# Patient Record
Sex: FEMALE | Race: BLACK OR AFRICAN AMERICAN | Employment: UNEMPLOYED | URBAN - METROPOLITAN AREA
[De-identification: names, ages, dates, MRNs, and addresses within clinical notes are randomized per-mention and may not be internally consistent; named-entity substitution may affect disease eponyms.]

---

## 2019-09-29 ENCOUNTER — HOSPITAL ENCOUNTER (EMERGENCY)
Age: 29
Discharge: HOME | End: 2019-09-29
Payer: COMMERCIAL

## 2019-09-29 VITALS
SYSTOLIC BLOOD PRESSURE: 134 MMHG | OXYGEN SATURATION: 99 % | WEIGHT: 256 LBS | TEMPERATURE: 98.3 F | DIASTOLIC BLOOD PRESSURE: 68 MMHG | BODY MASS INDEX: 38.8 KG/M2 | RESPIRATION RATE: 14 BRPM | HEIGHT: 68 IN | HEART RATE: 72 BPM

## 2019-09-29 DIAGNOSIS — J02.0 STREP PHARYNGITIS: Primary | ICD-10-CM

## 2019-09-29 PROCEDURE — 99282 EMERGENCY DEPT VISIT SF MDM: CPT

## 2019-09-29 RX ORDER — CEFDINIR 300 MG/1
300 CAPSULE ORAL 2 TIMES DAILY
Qty: 20 CAPSULE | Refills: 0 | Status: SHIPPED | OUTPATIENT
Start: 2019-09-29 | End: 2019-10-09

## 2019-09-29 ASSESSMENT — PAIN DESCRIPTION - LOCATION: LOCATION: THROAT

## 2019-09-29 ASSESSMENT — PAIN SCALES - GENERAL: PAINLEVEL_OUTOF10: 10

## 2019-11-18 ENCOUNTER — HOSPITAL ENCOUNTER (EMERGENCY)
Age: 29
Discharge: HOME | End: 2019-11-18
Attending: EMERGENCY MEDICINE
Payer: COMMERCIAL

## 2019-11-18 ENCOUNTER — APPOINTMENT (OUTPATIENT)
Dept: GENERAL RADIOLOGY | Age: 29
End: 2019-11-18
Payer: COMMERCIAL

## 2019-11-18 VITALS
SYSTOLIC BLOOD PRESSURE: 111 MMHG | WEIGHT: 262 LBS | HEART RATE: 58 BPM | OXYGEN SATURATION: 98 % | RESPIRATION RATE: 26 BRPM | BODY MASS INDEX: 39.71 KG/M2 | HEIGHT: 68 IN | DIASTOLIC BLOOD PRESSURE: 63 MMHG | TEMPERATURE: 97.8 F

## 2019-11-18 DIAGNOSIS — M54.32 SCIATICA OF LEFT SIDE: Primary | ICD-10-CM

## 2019-11-18 PROCEDURE — 72100 X-RAY EXAM L-S SPINE 2/3 VWS: CPT

## 2019-11-18 PROCEDURE — 6360000002 HC RX W HCPCS: Performed by: EMERGENCY MEDICINE

## 2019-11-18 PROCEDURE — 99284 EMERGENCY DEPT VISIT MOD MDM: CPT

## 2019-11-18 PROCEDURE — 96365 THER/PROPH/DIAG IV INF INIT: CPT

## 2019-11-18 PROCEDURE — 73502 X-RAY EXAM HIP UNI 2-3 VIEWS: CPT

## 2019-11-18 PROCEDURE — 2580000003 HC RX 258: Performed by: EMERGENCY MEDICINE

## 2019-11-18 PROCEDURE — 96372 THER/PROPH/DIAG INJ SC/IM: CPT

## 2019-11-18 PROCEDURE — 96375 TX/PRO/DX INJ NEW DRUG ADDON: CPT

## 2019-11-18 RX ORDER — HYDROCODONE BITARTRATE AND ACETAMINOPHEN 5; 325 MG/1; MG/1
1 TABLET ORAL EVERY 4 HOURS PRN
Qty: 18 TABLET | Refills: 0 | Status: SHIPPED | OUTPATIENT
Start: 2019-11-18 | End: 2019-11-21

## 2019-11-18 RX ORDER — METHOCARBAMOL 750 MG/1
1500 TABLET, FILM COATED ORAL 3 TIMES DAILY
Qty: 60 TABLET | Refills: 0 | Status: SHIPPED | OUTPATIENT
Start: 2019-11-18 | End: 2019-11-28

## 2019-11-18 RX ORDER — KETOROLAC TROMETHAMINE 30 MG/ML
30 INJECTION, SOLUTION INTRAMUSCULAR; INTRAVENOUS ONCE
Status: COMPLETED | OUTPATIENT
Start: 2019-11-18 | End: 2019-11-18

## 2019-11-18 RX ORDER — METHYLPREDNISOLONE SODIUM SUCCINATE 125 MG/2ML
125 INJECTION, POWDER, LYOPHILIZED, FOR SOLUTION INTRAMUSCULAR; INTRAVENOUS ONCE
Status: COMPLETED | OUTPATIENT
Start: 2019-11-18 | End: 2019-11-18

## 2019-11-18 RX ADMIN — HYDROMORPHONE HYDROCHLORIDE 1 MG: 1 INJECTION, SOLUTION INTRAMUSCULAR; INTRAVENOUS; SUBCUTANEOUS at 13:19

## 2019-11-18 RX ADMIN — METHOCARBAMOL 1000 MG: 100 INJECTION, SOLUTION INTRAMUSCULAR; INTRAVENOUS at 14:42

## 2019-11-18 RX ADMIN — KETOROLAC TROMETHAMINE 30 MG: 30 INJECTION, SOLUTION INTRAMUSCULAR at 14:41

## 2019-11-18 RX ADMIN — METHYLPREDNISOLONE SODIUM SUCCINATE 125 MG: 125 INJECTION, POWDER, FOR SOLUTION INTRAMUSCULAR; INTRAVENOUS at 14:41

## 2019-11-18 ASSESSMENT — PAIN SCALES - GENERAL
PAINLEVEL_OUTOF10: 10

## 2020-01-27 ENCOUNTER — HOSPITAL ENCOUNTER (EMERGENCY)
Age: 30
Discharge: HOME | End: 2020-01-27
Attending: EMERGENCY MEDICINE
Payer: COMMERCIAL

## 2020-01-27 VITALS
BODY MASS INDEX: 39.71 KG/M2 | HEIGHT: 68 IN | WEIGHT: 262 LBS | DIASTOLIC BLOOD PRESSURE: 76 MMHG | SYSTOLIC BLOOD PRESSURE: 114 MMHG | TEMPERATURE: 99.7 F | RESPIRATION RATE: 16 BRPM | HEART RATE: 87 BPM | OXYGEN SATURATION: 98 %

## 2020-01-27 LAB
HCG, URINE, POC: NEGATIVE
Lab: NORMAL
NEGATIVE QC PASS/FAIL: NORMAL
POSITIVE QC PASS/FAIL: NORMAL
STREP GRP A PCR: POSITIVE

## 2020-01-27 PROCEDURE — 6360000002 HC RX W HCPCS: Performed by: EMERGENCY MEDICINE

## 2020-01-27 PROCEDURE — 96372 THER/PROPH/DIAG INJ SC/IM: CPT

## 2020-01-27 PROCEDURE — 87880 STREP A ASSAY W/OPTIC: CPT

## 2020-01-27 PROCEDURE — 99283 EMERGENCY DEPT VISIT LOW MDM: CPT

## 2020-01-27 RX ORDER — AMOXICILLIN 500 MG/1
500 CAPSULE ORAL 2 TIMES DAILY
Qty: 20 CAPSULE | Refills: 0 | Status: SHIPPED | OUTPATIENT
Start: 2020-01-27 | End: 2020-02-06

## 2020-01-27 RX ORDER — DEXAMETHASONE SODIUM PHOSPHATE 10 MG/ML
10 INJECTION INTRAMUSCULAR; INTRAVENOUS ONCE
Status: COMPLETED | OUTPATIENT
Start: 2020-01-27 | End: 2020-01-27

## 2020-01-27 RX ORDER — KETOROLAC TROMETHAMINE 30 MG/ML
30 INJECTION, SOLUTION INTRAMUSCULAR; INTRAVENOUS ONCE
Status: COMPLETED | OUTPATIENT
Start: 2020-01-27 | End: 2020-01-27

## 2020-01-27 RX ADMIN — KETOROLAC TROMETHAMINE 30 MG: 30 INJECTION, SOLUTION INTRAMUSCULAR at 11:34

## 2020-01-27 RX ADMIN — DEXAMETHASONE SODIUM PHOSPHATE 10 MG: 10 INJECTION INTRAMUSCULAR; INTRAVENOUS at 11:32

## 2020-01-27 ASSESSMENT — ENCOUNTER SYMPTOMS
VOICE CHANGE: 0
NAUSEA: 0
SORE THROAT: 1
SHORTNESS OF BREATH: 0
DIARRHEA: 0
RHINORRHEA: 0
EYE REDNESS: 0
STRIDOR: 0
FACIAL SWELLING: 0
COLOR CHANGE: 0
VOMITING: 0
SINUS CONGESTION: 0
TROUBLE SWALLOWING: 0
COUGH: 0
EYE PAIN: 0
ABDOMINAL PAIN: 0
WHEEZING: 0
SINUS PRESSURE: 0

## 2020-01-27 ASSESSMENT — PAIN DESCRIPTION - LOCATION: LOCATION: THROAT

## 2020-01-27 ASSESSMENT — PAIN DESCRIPTION - PAIN TYPE: TYPE: ACUTE PAIN

## 2020-01-27 ASSESSMENT — PAIN SCALES - GENERAL: PAINLEVEL_OUTOF10: 8

## 2020-01-27 NOTE — ED PROVIDER NOTES
ATTENDING PROVIDER ATTESTATION:     Fernie Woods presented to the emergency department for evaluation of Shortness of Breath (pt states that she woke up this AM with throat swelling/sob, feels like she is having an allergic reaction (allergic to bees and raspberry - no encounter today)) and Oral Swelling   and was initially evaluated by the Medical Resident. See Original ED Note for H&P and ED course above. I have reviewed and discussed the case, including pertinent history (medical, surgical, family and social) and exam findings with the Medical Resident assigned to Jesusjude Peggy. I have personally performed and/or participated in the history, exam, medical decision making, and procedures and agree with all pertinent clinical information. I have reviewed my findings and recommendations with the assigned Medical Resident, Fernie Woods and members of family present at the time of disposition. My findings/plan: The encounter diagnosis was Pharyngitis due to Streptococcus species. Discharge Medication List as of 1/27/2020 12:04 PM      START taking these medications    Details   amoxicillin (AMOXIL) 500 MG capsule Take 1 capsule by mouth 2 times daily for 10 days, Disp-20 capsule, R-0Print           Luke Maldonado MD    Patient is a 25-year-old female, past medical history of diabetes, tobacco use, who presents via PV for SOB and throat pain. Patient reports she awakened this morning and noted a severe pain in her throat. It is worse with swallowing and eating and drinking. Nothing seems to make it better or worse. She is taken nothing for alleviation. She reports it feels like \"I am swallowing razors\" she reports a sensation that her throat is swelling as well. She denies any difficulty breathing shortness of breath or wheezing or stridor. Denies fevers or chills, coughing, recent ENT or throat procedures or surgeries. She denies known sick contacts.   Denies General: She is not in acute distress. Appearance: She is well-developed. She is obese. She is not ill-appearing, toxic-appearing or diaphoretic. HENT:      Head: Normocephalic and atraumatic. Right Ear: Tympanic membrane, ear canal and external ear normal. There is no impacted cerumen. Left Ear: Tympanic membrane, ear canal and external ear normal. There is no impacted cerumen. Nose: Nose normal. No congestion or rhinorrhea. Mouth/Throat:      Mouth: Mucous membranes are moist. Oral lesions present. No angioedema. Tongue: No lesions. Pharynx: Uvula midline. Posterior oropharyngeal erythema present. No pharyngeal swelling, oropharyngeal exudate or uvula swelling. Tonsils: No tonsillar exudate. Comments: Erythema the posterior oropharynx with vesicular lesions in the soft palate, no tongue swelling, no peritonsillar swelling or asymmetry uvula is midline  Eyes:      General: No scleral icterus. Right eye: No discharge. Left eye: No discharge. Conjunctiva/sclera: Conjunctivae normal.      Pupils: Pupils are equal, round, and reactive to light. Neck:      Musculoskeletal: Normal range of motion and neck supple. Thyroid: No thyromegaly. Vascular: No JVD. Trachea: No tracheal deviation. Cardiovascular:      Rate and Rhythm: Normal rate and regular rhythm. Heart sounds: Normal heart sounds. No murmur. No friction rub. No gallop. Pulmonary:      Effort: Pulmonary effort is normal. No accessory muscle usage or respiratory distress. Breath sounds: Normal breath sounds. No wheezing, rhonchi or rales. Chest:      Chest wall: No tenderness or crepitus. Abdominal:      General: Bowel sounds are normal. There is no distension. Palpations: Abdomen is soft. Tenderness: There is no abdominal tenderness. There is no guarding or rebound. Musculoskeletal: Normal range of motion. General: No tenderness or deformity. Skin:     General: Skin is warm and dry. Coloration: Skin is not pale. Findings: No erythema or rash. Neurological:      Mental Status: She is alert and oriented to person, place, and time. Cranial Nerves: No cranial nerve deficit. Motor: No abnormal muscle tone. Coordination: Coordination normal.          Procedures     MDM  Number of Diagnoses or Management Options  Pharyngitis due to Streptococcus species:   Diagnosis management comments: Patient presents with sore throat. She was found to have strep pharyngitis. No evidence of airway compromise or peritonsillar abscess. Plan to treat with antibiotics. Strict ED return precautions discussed. ED Course as of Jan 28 1253   Mon Jan 27, 2020   1213 Patient very well-appearing on exam.  States that she has \"shortness of breath\" in her throat, but no trouble breathing in her chest or chest pain. She has bilateral tonsillar enlargement and erythema with exudate. No evidence of peritonsillar abscess. Uvula midline. No tongue swelling or lip swelling. No soft palate elevation or evidence of ivan's. No stridor or trismus. No nuchal rigidity. [JA]      ED Course User Index  [JA] Veda Gregory MD        --------------------------------------------- PAST HISTORY ---------------------------------------------  Past Medical History:  has a past medical history of Bipolar 1 disorder ([facility] [address] [facility] Depression, Diabetes mellitus ([facility] FH: multiple miscarriages or stillbirths, Septal defect, atrial, Shingles, and Thyroid disease. Past Surgical History:  has a past surgical history that includes Dilation and curettage of uterus. Social History:  reports that she has been smoking cigarettes. She has a 6.00 pack-year smoking history. She has never used smokeless tobacco. She reports current drug use. Frequency: 1.00 time per week. Drug: Marijuana. She reports that she does not drink alcohol.     Family History: family history is not follow-up. The plan has been discussed in detail and they are aware of the specific conditions for emergent return, as well as the importance of follow-up. Discharge Medication List as of 1/27/2020 12:04 PM      START taking these medications    Details   amoxicillin (AMOXIL) 500 MG capsule Take 1 capsule by mouth 2 times daily for 10 days, Disp-20 capsule, R-0Print             Diagnosis:  1. Pharyngitis due to Streptococcus species        Disposition:  Patient's disposition: Discharge to home  Patient's condition is stable.           Good Goldsmith DO  Resident  01/28/20 [id number]       Heaven Maldonado MD  01/28/20 6153

## 2020-01-27 NOTE — ED NOTES
Bed: 10  Expected date:   Expected time:   Means of arrival:   Comments:  ems     Tory Ann RN  01/27/20 1111

## 2020-02-21 ENCOUNTER — HOSPITAL ENCOUNTER (EMERGENCY)
Age: 30
Discharge: HOME | End: 2020-02-21
Attending: EMERGENCY MEDICINE
Payer: COMMERCIAL

## 2020-02-21 ENCOUNTER — APPOINTMENT (OUTPATIENT)
Dept: GENERAL RADIOLOGY | Age: 30
End: 2020-02-21
Payer: COMMERCIAL

## 2020-02-21 VITALS
TEMPERATURE: 98.1 F | RESPIRATION RATE: 20 BRPM | DIASTOLIC BLOOD PRESSURE: 62 MMHG | HEIGHT: 68 IN | HEART RATE: 88 BPM | BODY MASS INDEX: 34.01 KG/M2 | WEIGHT: 224.4 LBS | SYSTOLIC BLOOD PRESSURE: 99 MMHG | OXYGEN SATURATION: 97 %

## 2020-02-21 LAB
ALBUMIN SERPL-MCNC: 4.1 G/DL (ref 3.5–5.2)
ALP BLD-CCNC: 61 U/L (ref 35–104)
ALT SERPL-CCNC: 7 U/L (ref 0–32)
ANION GAP SERPL CALCULATED.3IONS-SCNC: 13 MMOL/L (ref 7–16)
AST SERPL-CCNC: 8 U/L (ref 0–31)
BASOPHILS ABSOLUTE: 0.02 E9/L (ref 0–0.2)
BASOPHILS RELATIVE PERCENT: 0.2 % (ref 0–2)
BILIRUB SERPL-MCNC: <0.2 MG/DL (ref 0–1.2)
BUN BLDV-MCNC: 9 MG/DL (ref 6–20)
CALCIUM SERPL-MCNC: 9.5 MG/DL (ref 8.6–10.2)
CHLORIDE BLD-SCNC: 100 MMOL/L (ref 98–107)
CO2: 24 MMOL/L (ref 22–29)
CREAT SERPL-MCNC: 0.6 MG/DL (ref 0.5–1)
EOSINOPHILS ABSOLUTE: 0.16 E9/L (ref 0.05–0.5)
EOSINOPHILS RELATIVE PERCENT: 1.6 % (ref 0–6)
GFR AFRICAN AMERICAN: >60
GFR NON-AFRICAN AMERICAN: >60 ML/MIN/1.73
GLUCOSE BLD-MCNC: 220 MG/DL (ref 74–99)
HCT VFR BLD CALC: 42.1 % (ref 34–48)
HEMOGLOBIN: 13.7 G/DL (ref 11.5–15.5)
IMMATURE GRANULOCYTES #: 0.02 E9/L
IMMATURE GRANULOCYTES %: 0.2 % (ref 0–5)
LYMPHOCYTES ABSOLUTE: 4.13 E9/L (ref 1.5–4)
LYMPHOCYTES RELATIVE PERCENT: 41.7 % (ref 20–42)
MCH RBC QN AUTO: 31.4 PG (ref 26–35)
MCHC RBC AUTO-ENTMCNC: 32.5 % (ref 32–34.5)
MCV RBC AUTO: 96.6 FL (ref 80–99.9)
MONOCYTES ABSOLUTE: 0.7 E9/L (ref 0.1–0.95)
MONOCYTES RELATIVE PERCENT: 7.1 % (ref 2–12)
NEUTROPHILS ABSOLUTE: 4.88 E9/L (ref 1.8–7.3)
NEUTROPHILS RELATIVE PERCENT: 49.2 % (ref 43–80)
PDW BLD-RTO: 13.4 FL (ref 11.5–15)
PLATELET # BLD: 387 E9/L (ref 130–450)
PMV BLD AUTO: 10 FL (ref 7–12)
POTASSIUM REFLEX MAGNESIUM: 4.5 MMOL/L (ref 3.5–5)
RBC # BLD: 4.36 E12/L (ref 3.5–5.5)
SODIUM BLD-SCNC: 137 MMOL/L (ref 132–146)
TOTAL PROTEIN: 7.5 G/DL (ref 6.4–8.3)
WBC # BLD: 9.9 E9/L (ref 4.5–11.5)

## 2020-02-21 PROCEDURE — 6360000002 HC RX W HCPCS: Performed by: STUDENT IN AN ORGANIZED HEALTH CARE EDUCATION/TRAINING PROGRAM

## 2020-02-21 PROCEDURE — 85025 COMPLETE CBC W/AUTO DIFF WBC: CPT

## 2020-02-21 PROCEDURE — 80053 COMPREHEN METABOLIC PANEL: CPT

## 2020-02-21 PROCEDURE — 96375 TX/PRO/DX INJ NEW DRUG ADDON: CPT

## 2020-02-21 PROCEDURE — 72100 X-RAY EXAM L-S SPINE 2/3 VWS: CPT

## 2020-02-21 PROCEDURE — 99284 EMERGENCY DEPT VISIT MOD MDM: CPT

## 2020-02-21 PROCEDURE — 96365 THER/PROPH/DIAG IV INF INIT: CPT

## 2020-02-21 PROCEDURE — 2580000003 HC RX 258: Performed by: STUDENT IN AN ORGANIZED HEALTH CARE EDUCATION/TRAINING PROGRAM

## 2020-02-21 RX ORDER — KETOROLAC TROMETHAMINE 30 MG/ML
30 INJECTION, SOLUTION INTRAMUSCULAR; INTRAVENOUS ONCE
Status: COMPLETED | OUTPATIENT
Start: 2020-02-21 | End: 2020-02-21

## 2020-02-21 RX ORDER — AMOXICILLIN AND CLAVULANATE POTASSIUM 875; 125 MG/1; MG/1
1 TABLET, FILM COATED ORAL 2 TIMES DAILY WITH MEALS
Qty: 20 TABLET | Refills: 0 | Status: SHIPPED | OUTPATIENT
Start: 2020-02-21 | End: 2020-03-02

## 2020-02-21 RX ORDER — IBUPROFEN 600 MG/1
600 TABLET ORAL 4 TIMES DAILY PRN
Qty: 60 TABLET | Refills: 0 | Status: SHIPPED | OUTPATIENT
Start: 2020-02-21 | End: 2021-04-12

## 2020-02-21 RX ADMIN — AMPICILLIN SODIUM AND SULBACTAM SODIUM 3 G: 2; 1 INJECTION, POWDER, FOR SOLUTION INTRAMUSCULAR; INTRAVENOUS at 07:50

## 2020-02-21 RX ADMIN — KETOROLAC TROMETHAMINE 30 MG: 30 INJECTION, SOLUTION INTRAMUSCULAR; INTRAVENOUS at 07:28

## 2020-02-21 ASSESSMENT — ENCOUNTER SYMPTOMS
SINUS PAIN: 0
ABDOMINAL PAIN: 1
SHORTNESS OF BREATH: 0
WHEEZING: 0
DIARRHEA: 0
SORE THROAT: 0
NAUSEA: 1
CONSTIPATION: 0
VOMITING: 0
COUGH: 0
BLOOD IN STOOL: 0
BACK PAIN: 1

## 2020-02-21 ASSESSMENT — PAIN DESCRIPTION - DESCRIPTORS
DESCRIPTORS_2: OTHER (COMMENT)
DESCRIPTORS: CONSTANT;OTHER (COMMENT)

## 2020-02-21 ASSESSMENT — PAIN DESCRIPTION - LOCATION
LOCATION_2: TEETH
LOCATION: SACRUM

## 2020-02-21 ASSESSMENT — PAIN SCALES - GENERAL
PAINLEVEL_OUTOF10: 10
PAINLEVEL_OUTOF10: 10

## 2020-02-21 ASSESSMENT — PAIN DESCRIPTION - ONSET
ONSET_2: ON-GOING
ONSET: ON-GOING

## 2020-02-21 ASSESSMENT — PAIN DESCRIPTION - PAIN TYPE
TYPE_2: ACUTE PAIN
TYPE: ACUTE PAIN

## 2020-02-21 ASSESSMENT — PAIN DESCRIPTION - DIRECTION: RADIATING_TOWARDS: LEFT LEG

## 2020-02-21 ASSESSMENT — PAIN DESCRIPTION - ORIENTATION
ORIENTATION_2: RIGHT;UPPER;LOWER
ORIENTATION: LEFT

## 2020-02-21 ASSESSMENT — PAIN DESCRIPTION - INTENSITY: RATING_2: 10

## 2020-02-21 ASSESSMENT — PAIN DESCRIPTION - FREQUENCY: FREQUENCY: CONTINUOUS

## 2020-02-21 ASSESSMENT — PAIN DESCRIPTION - DURATION: DURATION_2: INTERMITTENT

## 2020-02-21 NOTE — ED TRIAGE NOTES
Pt arrived in ED with c/o pain originating in her sacrum/coccyx that radiates down her left leg onset a few days ago, worsening. Hx sciatica. C/o upper and lower right sided dental pain where pt states she has a \"hole\" in her right upper gum and she feels she has an abscess. She states she called the dental clinic and it would be a month before she could be seen. Denies taking any pain medications PTA. No visitors at bedside.
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
## 2021-04-12 ENCOUNTER — ANCILLARY PROCEDURE (OUTPATIENT)
Dept: OBGYN CLINIC | Age: 31
End: 2021-04-12
Payer: COMMERCIAL

## 2021-04-12 ENCOUNTER — INITIAL PRENATAL (OUTPATIENT)
Dept: OBGYN CLINIC | Age: 31
End: 2021-04-12
Payer: COMMERCIAL

## 2021-04-12 VITALS
SYSTOLIC BLOOD PRESSURE: 98 MMHG | WEIGHT: 238.13 LBS | DIASTOLIC BLOOD PRESSURE: 61 MMHG | BODY MASS INDEX: 36.21 KG/M2 | HEART RATE: 83 BPM

## 2021-04-12 DIAGNOSIS — Q76.49 CAUDAL REGRESSION SYNDROME: ICD-10-CM

## 2021-04-12 DIAGNOSIS — Z3A.22 22 WEEKS GESTATION OF PREGNANCY: ICD-10-CM

## 2021-04-12 DIAGNOSIS — Q21.10 ASD (ATRIAL SEPTAL DEFECT): ICD-10-CM

## 2021-04-12 DIAGNOSIS — O35.8XX9: ICD-10-CM

## 2021-04-12 DIAGNOSIS — O99.332 HIGH RISK PREGNANCY DUE TO SMOKING IN SECOND TRIMESTER: ICD-10-CM

## 2021-04-12 DIAGNOSIS — O26.20 HABITUAL ABORTER, ANTEPARTUM: ICD-10-CM

## 2021-04-12 DIAGNOSIS — O99.342 BIPOLAR DISEASE DURING PREGNANCY IN SECOND TRIMESTER (HCC): ICD-10-CM

## 2021-04-12 DIAGNOSIS — Z36.3 ANTENATAL SCREENING FOR MALFORMATION USING ULTRASONICS: Primary | ICD-10-CM

## 2021-04-12 DIAGNOSIS — F31.9 BIPOLAR DISEASE DURING PREGNANCY IN SECOND TRIMESTER (HCC): ICD-10-CM

## 2021-04-12 DIAGNOSIS — I10 ESSENTIAL HYPERTENSION: ICD-10-CM

## 2021-04-12 DIAGNOSIS — O35.2XX0: ICD-10-CM

## 2021-04-12 DIAGNOSIS — O99.280 THYROID DYSFUNCTION, ANTEPARTUM: ICD-10-CM

## 2021-04-12 DIAGNOSIS — O24.112 TYPE 2 DIABETES MELLITUS COMPLICATING PREGNANCY, ANTEPARTUM, SECOND TRIMESTER: ICD-10-CM

## 2021-04-12 DIAGNOSIS — O99.212 OBESITY COMPLICATING PERIPREGNANCY, ANTEPARTUM, SECOND TRIMESTER: ICD-10-CM

## 2021-04-12 DIAGNOSIS — E07.9 THYROID DYSFUNCTION, ANTEPARTUM: ICD-10-CM

## 2021-04-12 LAB
GLUCOSE URINE, POC: NORMAL
PROTEIN UA: NEGATIVE

## 2021-04-12 PROCEDURE — 76811 OB US DETAILED SNGL FETUS: CPT | Performed by: OBSTETRICS & GYNECOLOGY

## 2021-04-12 PROCEDURE — 81002 URINALYSIS NONAUTO W/O SCOPE: CPT | Performed by: OBSTETRICS & GYNECOLOGY

## 2021-04-12 PROCEDURE — 99203 OFFICE O/P NEW LOW 30 MIN: CPT | Performed by: OBSTETRICS & GYNECOLOGY

## 2021-04-12 PROCEDURE — 4004F PT TOBACCO SCREEN RCVD TLK: CPT | Performed by: OBSTETRICS & GYNECOLOGY

## 2021-04-12 PROCEDURE — 3046F HEMOGLOBIN A1C LEVEL >9.0%: CPT | Performed by: OBSTETRICS & GYNECOLOGY

## 2021-04-12 PROCEDURE — G8427 DOCREV CUR MEDS BY ELIG CLIN: HCPCS | Performed by: OBSTETRICS & GYNECOLOGY

## 2021-04-12 PROCEDURE — 2022F DILAT RTA XM EVC RTNOPTHY: CPT | Performed by: OBSTETRICS & GYNECOLOGY

## 2021-04-12 PROCEDURE — G8419 CALC BMI OUT NRM PARAM NOF/U: HCPCS | Performed by: OBSTETRICS & GYNECOLOGY

## 2021-04-12 RX ORDER — METOPROLOL SUCCINATE 25 MG/1
25 TABLET, EXTENDED RELEASE ORAL DAILY
COMMUNITY

## 2021-04-12 NOTE — PATIENT INSTRUCTIONS
Patient Education        Learning About When to Call Your Doctor During Pregnancy (After 20 Weeks)  Your Care Instructions  It's common to have concerns about what might be a problem during pregnancy. Although most pregnant women don't have any serious problems, it's important to know when to call your doctor if you have certain symptoms or signs of labor. These are general suggestions. Your doctor may give you some more information about when to call. When to call your doctor (after 20 weeks)  Call 911 anytime you think you may need emergency care. For example, call if:  · You have severe vaginal bleeding. · You have sudden, severe pain in your belly. · You passed out (lost consciousness). · You have a seizure. · You see or feel the umbilical cord. · You think you are about to deliver your baby and can't make it safely to the hospital.  Call your doctor now or seek immediate medical care if:  · You have vaginal bleeding. · You have belly pain. · You have a fever. · You have symptoms of preeclampsia, such as:  ? Sudden swelling of your face, hands, or feet. ? New vision problems (such as dimness, blurring, or seeing spots). ? A severe headache. · You have a sudden release of fluid from your vagina. (You think your water broke.)  · You think that you may be in labor. This means that you've had at least 6 contractions in an hour. · You notice that your baby has stopped moving or is moving much less than normal.  · You have symptoms of a urinary tract infection. These may include:  ? Pain or burning when you urinate. ? A frequent need to urinate without being able to pass much urine. ? Pain in the flank, which is just below the rib cage and above the waist on either side of the back. ? Blood in your urine. Watch closely for changes in your health, and be sure to contact your doctor if:  · You have vaginal discharge that smells bad. · You have skin changes, such as:  ? A rash. ? Itching.   ? Yellow results and keep a list of the medicines you take. Where can you learn more? Go to https://chpepiceweb.healthapta.[email or web address] org and sign in to your [employer] account. Enter  in the [facility] box to learn more about \"Learning About When to Call Your Doctor During Pregnancy (After 20 Weeks). \"     If you do not have an account, please click on the \"Sign Up Now\" link. Current as of: October 8, 2020               Content Version: 12.8  © 1254-6803 [employer]. Care instructions adapted under license by [facility] ([facility]). If you have questions about a medical condition or this instruction, always ask your healthcare professional. [place] [address] any warranty or liability for your use of this information. Patient Education        Weeks 22 to 26 of Your Pregnancy: Care Instructions  Overview     As you enter your 7th month of pregnancy at week 26, your baby's lungs are growing stronger and getting ready to breathe. You may notice that your baby responds to the sound of your or your partner's voice. You may also notice that your baby does less turning and twisting and more squirming, kicking, or jerking. Jerking often means that your baby has hiccups. Hiccups are normal and are only temporary. You may want to think about attending a childbirth preparation class. This is also a good time to start thinking about whether you want to have pain medicine during labor. Most pregnant women are tested for gestational diabetes between weeks 25 and 28. Gestational diabetes occurs when your blood sugar level gets too high when you're pregnant. The test is important, because you can have gestational diabetes and not know it. But the condition can cause problems for your baby. Follow-up care is a key part of your treatment and safety. Be sure to make and go to all appointments, and call your doctor if you are having problems.  It's also a good idea to know your test results and keep a list of the medicines you take. How can you care for yourself at home? Ease discomfort from your baby's kicking  · Change your position. Sometimes this will cause your baby to change position too. · Take a deep breath while you raise your arm over your head. Then breathe out while you drop your arm. Do Kegel exercises to prevent urine from leaking  · You can do Kegel exercises while you stand or sit. ? Squeeze the same muscles you would use to stop your urine. Your belly and thighs should not move. ? Hold the squeeze for 3 seconds, and then relax for 3 seconds. ? Start with 3 seconds. Then add 1 second each week until you are able to squeeze for 10 seconds. ? Repeat the exercise 10 to 15 times for each session. Do three or more sessions each day. Ease or reduce swelling in your feet, ankles, hands, and fingers  · If your fingers are puffy, take off your rings. · Do not eat high-salt foods, such as potato chips. · Prop up your feet on a stool or couch as much as possible. Sleep with pillows under your feet. · Do not stand for long periods of time or wear tight shoes. · Wear support stockings. Where can you learn more? Go to https://Constant Therapy.[employer]. org and sign in to your [employer] account. Enter G264 in the [facility] box to learn more about \"Weeks 22 to 26 of Your Pregnancy: Care Instructions. \"     If you do not have an account, please click on the \"Sign Up Now\" link. Current as of: October 8, 2020               Content Version: 12.8  © 4922-0710 [employer]. Care instructions adapted under license by [facility] ([facility]). If you have questions about a medical condition or this instruction, always ask your healthcare professional. [place] [address] any warranty or liability for your use of this information.

## 2021-04-12 NOTE — LETTER
[facility] MATERNAL FETAL MEDICINE  [address] [address] [address]   Dept: [phone]  Loc: [phone]  Yahaira Tinajero MD                                           [facility] Consultation Letter     21     Chris Alfonso MD     Re: Patient: Radha Angulo  YOB: 1990    MR Number: [id number] Date of Visit: 2021     Dear Dr. Enma Mcdonald  I had the pleasure of seeing your patient, Radha Angulo, in consultation in the [facility] Fetal Medicine Department of [facility]). DOS: 21     [place] MATERNAL FETAL MEDICINE      MATERNAL-FETAL MEDICINE CONSULT    Referring/Requesting Provider: Abigail Andino MD  [address]  [address] [place] [address]       CHIEF COMPLAINT: Type 2 DM, chronic hypertension, obesity, bipolar, smoker, ASD, and her sister has T21. HISTORY OF PRESENT ILLNESS:   Bob Merino is a 26 y.o. female [id number] at 22w6d who presents for ultrasound and consultation regarding Type 2 DM, chronic hypertension, obesity, bipolar, smoker, ASD, and her sister has T21. The patient denies nausea, vomiting, vaginal bleeding, leakage of fluid, contractions, and/or abdominal pain. She also denies blurring of vision, visual disturbances, headaches, and /or epigastric pain.      OB HISTORY:  OB History    Para Term  AB Living   11 2 2 0 8 0   SAB TAB Ectopic Molar Multiple Live Births   8 0 0 0 0 0      # Outcome Date GA Lbr Jas/2nd Weight Sex Delivery Anes PTL Lv   11 Current            10 Term 11/15/17    F CS-LTranv      9 Term 07    F Vag-Spont      8 SAB            7 SAB            6 SAB            5 SAB            4 SAB            3 SAB            2 SAB            1 SAB               Obstetric Comments   8 SAB       PAST MEDICAL HISTORY:  Past Medical History:   Diagnosis Date    Anxiety     Bipolar 1 disorder ([facility]     Depression     Diabetes mellitus ([facility]     FH: multiple miscarriages or stillbirths     Hypertension     Septal defect, atrial     Shingles     Thyroid disease        PAST SURGICAL HISTORY:  Past Surgical History:   Procedure Laterality Date     SECTION  2017    DILATION AND CURETTAGE OF UTERUS         PERTINENT FAMILY HISTORY:  History reviewed. No pertinent family history. MEDS:   Current Outpatient Rx   Medication Sig Dispense Refill    Prenatal Vit-Fe Fumarate-FA (PRENATAL VITAMINS PO) Take 1 tablet by mouth daily      metoprolol succinate (TOPROL XL) 25 MG extended release tablet Take 25 mg by mouth daily      metFORMIN (GLUCOPHAGE) 500 MG tablet Take 500 mg by mouth 2 times daily (with meals) Taking 500 mg lunch and dinner      ACYCLOVIR PO Take 500 mg by mouth 2 times daily as needed          ALLERGY:   Allergies   Allergen Reactions    Bee Venom     Raspberry      Review of the systems:  Galina denies fever, chills, headaches, visual changes, stuffy/running nose, sore throat, chest pain, heart palpitations, edema, pain with breathing, shortness of breath, nausea or vomiting, difficult or painful urination, joint or muscle pain, numbness, tingling, tremors, and/or seizures. Physical Exam  Vitals signs and nursing note reviewed. BP 98/61   Pulse 83   Wt 238 lb 2 oz (108 kg)   LMP 2020   BMI 36.21 kg/m²   Constitutional: Appearance: She is not in distress  Heart rate is regular  Neck is supple with normal range of motion  No respiratory distress  Abdomen is soft  Neurological:      General: No focal deficit present. Mental Status: She is alert and oriented to person, place, and time. Psychiatric:         Mood and Affect: Mood normal.         Behavior: Behavior normal.    Fetal heart tones: Positive      IMAGING:  Please see ultrasound report for full details.      LABS:    Initial Prenatal on 2021   Component Date Value Ref Range Status    Protein, UA 2021 Negative  Negative Final    Glucose, UA POC 2021 neg   Final         IMPRESSION:   Kaden Robins is specialist.  8. Recommend baby aspirin        Essential hypertension 2021     Overview Note:     - She has essential hypertension on Metoprolol. Recommend:  - Baseline preeclampsia lab work is indicated (CMP, P/C ratio)  - Ophthalmological evaluation.  - EKG and echocardiogram.  - Baby aspirin a day.  Habitual aborter, antepartum 2021     Overview Note:     - She has multiple first trimester losses in the ifrst trimester    Recommend:  1. Lupus anticoagulant  2. Anticardiolipin antibodies  3. B2 glycoprotein antibodies      Caudal regression syndrome 2021     Overview Note:     The patient has a daughter with caudal regression most likely secondary to uncontrolled diabetes. Recommendations:  1. Detailed fetal anatomy scan  2. Genetic counseling      Obesity complicating peripregnancy, antepartum, second trimester 2021     Overview Note:     We reviewed that obesity complicating pregnancy is associated with increased maternal and fetal risks including: gestational diabetes, hypertensive disorders, iatrogenic  delivery, dysfunctional labor, postterm pregnancy, large for gestational age infant, shoulder dystocia, obstructive sleep apnea, postpartum hemorrhage, stillbirth, fetal anomalies,  delivery and postcesarean complications. RECOMMENDATIONS:   Nutrition consultation is recommended.  Reviewed diet, exercise, and weight gain. [place] [facility] recommend weight gain in pregnancy: Obese (all classes) > 30:  total weight gain 11-20 lbs, 0.5 lb per week in the second and third trimester.    Low dose aspirin is recommended   After  delivery, provide adequate thromboprophylaxis    If  delivery, surgical technique needs to be modified for adequate exposure and postpartum care should be modified to reduce the risk of obesity-associated complications   Lactation consultation and encourage breastfeeding   I recommended an intrauterine contraception (Mirena) due to efficacy and protective affect against uterine cancer              Thyroid dysfunction, antepartum 04/12/2021     Overview Note:     - She has hypothyroidism on no medications. I ordered TSH and Free T4 for evaluation of her thyroid function.  Hereditary disease in family possibly affecting fetus, affecting management of mother, antepartum condition or complication, single gestation 04/12/2021     Overview Note:     The patient has a sister with Down syndrome. She has a low risk cell free test in this pregnancy.  High risk pregnancy due to smoking in second trimester 04/12/2021     Overview Note:     The risks of smoking have been discussed:   PTB   PPROM   Abruption   FGR   SIDS  I urged her to quit smoking.  ASD (atrial septal defect) 04/12/2021     Overview Note:     - She has an ASD  - No surgical repair     Recommend;  Cardiology consultation  echocardiogram      Bipolar disease during pregnancy in second trimester ([facility] [address] [facility] 04/12/2021     Overview Note:     - On no medications. Recommend   Psychiatric consultation        She will be admitted to [facility] in 2 or 3 days. I ordered:  Hemoglobin A1c,TSH, T4, LAC, KHURRAM, B2 glycoprotein. The rest will be done at [facility]. The total patient time of the visit was 30 minutes, of which was greater than 50% of the time was spent counseling and coordinating care. Dorothy Woody MD            Thank you for allowing us to participate in the care of your patient. Should you or the family have any questions or concerns, please do not hesitate to contact us.     Sincerely,    Dorothy Woody MD

## 2021-04-12 NOTE — PROGRESS NOTES
DOS: 21     [place] MATERNAL FETAL MEDICINE      MATERNAL-FETAL MEDICINE CONSULT    Referring/Requesting Provider: Justin Lim MD  [address]  [address] [place] [address],  [address] [place] [address]       CHIEF COMPLAINT: Type 2 DM, chronic hypertension, obesity, bipolar, smoker, ASD, and her sister has T21. HISTORY OF PRESENT ILLNESS:   Woody Browne is a 26 y.o. female [id number] at 22w6d who presents for ultrasound and consultation regarding Type 2 DM, chronic hypertension, obesity, bipolar, smoker, ASD, and her sister has T21. The patient denies nausea, vomiting, vaginal bleeding, leakage of fluid, contractions, and/or abdominal pain. She also denies blurring of vision, visual disturbances, headaches, and /or epigastric pain. OB HISTORY:  OB History    Para Term  AB Living   11 2 2 0 8 0   SAB TAB Ectopic Molar Multiple Live Births   8 0 0 0 0 0      # Outcome Date GA Lbr Jas/2nd Weight Sex Delivery Anes PTL Lv   11 Current            10 Term 11/15/17    F CS-LTranv      9 Term 07    F Vag-Spont      8 SAB            7 SAB            6 SAB            5 SAB            4 SAB            3 SAB            2 SAB            1 SAB               Obstetric Comments   8 SAB       PAST MEDICAL HISTORY:  Past Medical History:   Diagnosis Date    Anxiety     Bipolar 1 disorder ([facility] [address]     Depression     Diabetes mellitus ([facility] [facility]     FH: multiple miscarriages or stillbirths     Hypertension     Septal defect, atrial     Shingles     Thyroid disease        PAST SURGICAL HISTORY:  Past Surgical History:   Procedure Laterality Date     SECTION      DILATION AND CURETTAGE OF UTERUS         PERTINENT FAMILY HISTORY:  History reviewed. No pertinent family history.     MEDS:   Current Outpatient Rx   Medication Sig Dispense Refill    Prenatal Vit-Fe Fumarate-FA (PRENATAL VITAMINS PO) Take 1 tablet by mouth daily      metoprolol succinate (TOPROL XL) 25 MG extended release tablet Take 25 mg by mouth daily  metFORMIN (GLUCOPHAGE) 500 MG tablet Take 500 mg by mouth 2 times daily (with meals) Taking 500 mg lunch and dinner      ACYCLOVIR PO Take 500 mg by mouth 2 times daily as needed          ALLERGY:   Allergies   Allergen Reactions    Bee Venom     Raspberry      Review of the systems:  Galina denies fever, chills, headaches, visual changes, stuffy/running nose, sore throat, chest pain, heart palpitations, edema, pain with breathing, shortness of breath, nausea or vomiting, difficult or painful urination, joint or muscle pain, numbness, tingling, tremors, and/or seizures. Physical Exam  Vitals signs and nursing note reviewed. BP 98/61   Pulse 83   Wt 238 lb 2 oz (108 kg)   LMP 11/03/2020   BMI 36.21 kg/m²   Constitutional: Appearance: She is not in distress  Heart rate is regular  Neck is supple with normal range of motion  No respiratory distress  Abdomen is soft  Neurological:      General: No focal deficit present. Mental Status: She is alert and oriented to person, place, and time. Psychiatric:         Mood and Affect: Mood normal.         Behavior: Behavior normal.    Fetal heart tones: Positive      IMAGING:  Please see ultrasound report for full details. LABS:    Initial Prenatal on 04/12/2021   Component Date Value Ref Range Status    Protein, UA 04/12/2021 Negative  Negative Final    Glucose, UA POC 04/12/2021 neg   Final         IMPRESSION:   Stanly Clock is a 26 y.o. female [id number]     RECOMMENDATIONS:  Patient Active Problem List    Diagnosis Date Noted    22 weeks gestation of pregnancy 04/12/2021    Type 2 diabetes mellitus complicating pregnancy, antepartum, second trimester 04/12/2021     Overview Note:     - The patient has type 2 DM for 8 years. She is on Metformin 500 mg twice daily.  - She has no diabetes logs to evaluate her glycemic control.  - Random blood glucose in February was > 200.   I advised her to be admitted immediately at [facility] for immediate insulin therapy. - She is moving to [place] and will be able to be admitted in 2 to 3 days. The potential for diabetes related complications was explained. Recommendations:   1. Admission to [facility]. 2. Follow up in our office in 1 week for diabetes co-management. 3. Comprehensive anatomical fetal survey has been performed. 4. Fetal echocardiogram at 22 weeks. This may be done at [facility]. 5. Serial growth ultrasounds q 4 weeks starting at 24 weeks' gestation. 6.  fetal testing 2 times a week and daily fetal kick counts starting at 32 weeks' gestation. 7. Check TSH, free T4, urine protein:creatinine if not yet done this pregnancy. 8. Recommend delivery at 40 to 38 weeks gestation, unless earlier delivery is indicated. Vaginal delivery is acceptable if the estimated fetal weight near delivery is < 4500 grams.  delivery recommended if EFW is > 4500 grams to decrease the risk of shoulder dystocia and risk of brachial plexus injury. 9. Postpartum, recommend consistent carbohydrate diet (calories depends on breast or bottle feeding) and decreasing insulin dose to 1/3 of her insulin requirement at the end of pregnancy. Recommend prior to any subsequent pregnancies, that her HA1C < 6.5% to decrease the risk of congenital malformations. I recommend she has a follow-up appointment within one week of delivery with our diabetic team, including our dietician and lactation specialist.  8. Recommend baby aspirin        Essential hypertension 2021     Overview Note:     - She has essential hypertension on Metoprolol. Recommend:  - Baseline preeclampsia lab work is indicated (CMP, P/C ratio)  - Ophthalmological evaluation.  - EKG and echocardiogram.  - Baby aspirin a day.  Habitual aborter, antepartum 2021     Overview Note:     - She has multiple first trimester losses in the ifrst trimester    Recommend:  1. Lupus anticoagulant  2. Anticardiolipin antibodies  3.  B2 glycoprotein antibodies      Caudal regression syndrome 2021     Overview Note:     The patient has a daughter with caudal regression most likely secondary to uncontrolled diabetes. Recommendations:  1. Detailed fetal anatomy scan  2. Genetic counseling      Obesity complicating peripregnancy, antepartum, second trimester 2021     Overview Note:     We reviewed that obesity complicating pregnancy is associated with increased maternal and fetal risks including: gestational diabetes, hypertensive disorders, iatrogenic  delivery, dysfunctional labor, postterm pregnancy, large for gestational age infant, shoulder dystocia, obstructive sleep apnea, postpartum hemorrhage, stillbirth, fetal anomalies,  delivery and postcesarean complications. RECOMMENDATIONS:   Nutrition consultation is recommended.  Reviewed diet, exercise, and weight gain. [place] [facility] recommend weight gain in pregnancy: Obese (all classes) > 30:  total weight gain 11-20 lbs, 0.5 lb per week in the second and third trimester.  Low dose aspirin is recommended   After  delivery, provide adequate thromboprophylaxis    If  delivery, surgical technique needs to be modified for adequate exposure and postpartum care should be modified to reduce the risk of obesity-associated complications   Lactation consultation and encourage breastfeeding   I recommended an intrauterine contraception (Mirena) due to efficacy and protective affect against uterine cancer              Thyroid dysfunction, antepartum 2021     Overview Note:     - She has hypothyroidism on no medications. I ordered TSH and Free T4 for evaluation of her thyroid function.  Hereditary disease in family possibly affecting fetus, affecting management of mother, antepartum condition or complication, single gestation 2021     Overview Note:     The patient has a sister with Down syndrome.   She has a low risk cell free test in this pregnancy.  High risk pregnancy due to smoking in second trimester 04/12/2021     Overview Note:     The risks of smoking have been discussed:   PTB   PPROM   Abruption   FGR   SIDS  I urged her to quit smoking.  ASD (atrial septal defect) 04/12/2021     Overview Note:     - She has an ASD  - No surgical repair     Recommend;  Cardiology consultation  echocardiogram      Bipolar disease during pregnancy in second trimester ([address] 04/12/2021     Overview Note:     - On no medications. Recommend   Psychiatric consultation        She will be admitted to [facility] in 2 or 3 days. I ordered:  Hemoglobin A1c,TSH, T4, LAC, KHURRAM, B2 glycoprotein. The rest will be done at [facility]. The total patient time of the visit was 30 minutes, of which was greater than 50% of the time was spent counseling and coordinating care.     Ghazal Lyn MD

## 2021-06-24 ENCOUNTER — HOSPITAL ENCOUNTER (OUTPATIENT)
Age: 31
Setting detail: OBSERVATION
Discharge: HOME | End: 2021-06-24
Attending: OBSTETRICS & GYNECOLOGY | Admitting: OBSTETRICS & GYNECOLOGY
Payer: COMMERCIAL

## 2021-06-24 VITALS
RESPIRATION RATE: 16 BRPM | WEIGHT: 240 LBS | HEART RATE: 86 BPM | BODY MASS INDEX: 36.37 KG/M2 | SYSTOLIC BLOOD PRESSURE: 116 MMHG | DIASTOLIC BLOOD PRESSURE: 57 MMHG | HEIGHT: 68 IN | TEMPERATURE: 97.5 F

## 2021-06-24 PROBLEM — O26.93 COMPLICATED PREGNANCY, THIRD TRIMESTER: Status: ACTIVE | Noted: 2021-06-24

## 2021-06-24 LAB
ALBUMIN SERPL-MCNC: 3.1 G/DL (ref 3.5–5.2)
ALP BLD-CCNC: 128 U/L (ref 35–104)
ALT SERPL-CCNC: 7 U/L (ref 0–32)
AMPHETAMINE SCREEN, URINE: NOT DETECTED
ANION GAP SERPL CALCULATED.3IONS-SCNC: 13 MMOL/L (ref 7–16)
AST SERPL-CCNC: 14 U/L (ref 0–31)
BACTERIA: ABNORMAL /HPF
BARBITURATE SCREEN URINE: NOT DETECTED
BASOPHILS ABSOLUTE: 0.09 E9/L (ref 0–0.2)
BASOPHILS RELATIVE PERCENT: 0.9 % (ref 0–2)
BENZODIAZEPINE SCREEN, URINE: NOT DETECTED
BILIRUB SERPL-MCNC: <0.2 MG/DL (ref 0–1.2)
BILIRUBIN URINE: NEGATIVE
BLOOD, URINE: NEGATIVE
BUN BLDV-MCNC: 7 MG/DL (ref 6–20)
BURR CELLS: NORMAL
CALCIUM SERPL-MCNC: 9.2 MG/DL (ref 8.6–10.2)
CANNABINOID SCREEN URINE: POSITIVE
CHLORIDE BLD-SCNC: 103 MMOL/L (ref 98–107)
CLARITY: CLEAR
CO2: 19 MMOL/L (ref 22–29)
COCAINE METABOLITE SCREEN URINE: NOT DETECTED
COLOR: YELLOW
COMMENT: NORMAL
CREAT SERPL-MCNC: 0.6 MG/DL (ref 0.5–1)
EOSINOPHILS ABSOLUTE: 0.09 E9/L (ref 0.05–0.5)
EOSINOPHILS RELATIVE PERCENT: 0.9 % (ref 0–6)
EPITHELIAL CELLS, UA: ABNORMAL /HPF
FENTANYL SCREEN, URINE: NOT DETECTED
GFR AFRICAN AMERICAN: >60
GFR NON-AFRICAN AMERICAN: >60 ML/MIN/1.73
GLUCOSE BLD-MCNC: 209 MG/DL (ref 74–99)
GLUCOSE URINE: 500 MG/DL
HCT VFR BLD CALC: 34.4 % (ref 34–48)
HEMOGLOBIN: 11.6 G/DL (ref 11.5–15.5)
INTEGRITY CHECK, CREATININE, URINE: 102.1
INTEGRITY CHECK, OXIDANT, URINE: <40
INTEGRITY CHECK, PH, URINE: 6.5 (ref 4.5–9)
INTEGRITY CHECK, SPECIFIC GRAVITY, URINE: 1.01 (ref 1–1.03)
INTEGRITY CHECK, SPECIMEN INTEGRITY, URINE: NORMAL
KETONES, URINE: NEGATIVE MG/DL
LEUKOCYTE ESTERASE, URINE: ABNORMAL
LYMPHOCYTES ABSOLUTE: 2.99 E9/L (ref 1.5–4)
LYMPHOCYTES RELATIVE PERCENT: 29.3 % (ref 20–42)
Lab: ABNORMAL
MCH RBC QN AUTO: 31.8 PG (ref 26–35)
MCHC RBC AUTO-ENTMCNC: 33.7 % (ref 32–34.5)
MCV RBC AUTO: 94.2 FL (ref 80–99.9)
METHADONE SCREEN, URINE: NOT DETECTED
MONOCYTES ABSOLUTE: 0.41 E9/L (ref 0.1–0.95)
MONOCYTES RELATIVE PERCENT: 4.3 % (ref 2–12)
NEUTROPHILS ABSOLUTE: 6.7 E9/L (ref 1.8–7.3)
NEUTROPHILS RELATIVE PERCENT: 64.7 % (ref 43–80)
NITRITE, URINE: NEGATIVE
OPIATE SCREEN URINE: NOT DETECTED
OVALOCYTES: NORMAL
OXYCODONE URINE: NOT DETECTED
PDW BLD-RTO: 13 FL (ref 11.5–15)
PH UA: 7 (ref 5–9)
PHENCYCLIDINE SCREEN URINE: NOT DETECTED
PLATELET # BLD: 279 E9/L (ref 130–450)
PMV BLD AUTO: 11.5 FL (ref 7–12)
POIKILOCYTES: NORMAL
POTASSIUM SERPL-SCNC: 4 MMOL/L (ref 3.5–5)
PROTEIN UA: ABNORMAL MG/DL
RBC # BLD: 3.65 E12/L (ref 3.5–5.5)
RBC UA: ABNORMAL /HPF (ref 0–2)
SODIUM BLD-SCNC: 135 MMOL/L (ref 132–146)
SPECIFIC GRAVITY UA: 1.02 (ref 1–1.03)
THC NORMALIZED, QUANTITIATIVE, URINE: 274.7
THC-COOH, QUANTITATIVE, URINE: 280.5
TOTAL PROTEIN: 6.5 G/DL (ref 6.4–8.3)
UROBILINOGEN, URINE: 1 E.U./DL
WBC # BLD: 10.3 E9/L (ref 4.5–11.5)
WBC UA: ABNORMAL /HPF (ref 0–5)

## 2021-06-24 PROCEDURE — 85025 COMPLETE CBC W/AUTO DIFF WBC: CPT

## 2021-06-24 PROCEDURE — 80307 DRUG TEST PRSMV CHEM ANLYZR: CPT

## 2021-06-24 PROCEDURE — 2580000003 HC RX 258: Performed by: OBSTETRICS & GYNECOLOGY

## 2021-06-24 PROCEDURE — 81001 URINALYSIS AUTO W/SCOPE: CPT

## 2021-06-24 PROCEDURE — 80053 COMPREHEN METABOLIC PANEL: CPT

## 2021-06-24 PROCEDURE — G0378 HOSPITAL OBSERVATION PER HR: HCPCS

## 2021-06-24 PROCEDURE — 36415 COLL VENOUS BLD VENIPUNCTURE: CPT

## 2021-06-24 PROCEDURE — G0480 DRUG TEST DEF 1-7 CLASSES: HCPCS

## 2021-06-24 PROCEDURE — G0379 DIRECT REFER HOSPITAL OBSERV: HCPCS

## 2021-06-24 RX ORDER — ONDANSETRON 4 MG/1
4 TABLET, ORALLY DISINTEGRATING ORAL EVERY 8 HOURS PRN
Status: DISCONTINUED | OUTPATIENT
Start: 2021-06-24 | End: 2021-06-24 | Stop reason: HOSPADM

## 2021-06-24 RX ORDER — SODIUM CHLORIDE, SODIUM LACTATE, POTASSIUM CHLORIDE, AND CALCIUM CHLORIDE .6; .31; .03; .02 G/100ML; G/100ML; G/100ML; G/100ML
500 INJECTION, SOLUTION INTRAVENOUS ONCE
Status: COMPLETED | OUTPATIENT
Start: 2021-06-24 | End: 2021-06-24

## 2021-06-24 RX ORDER — ONDANSETRON 2 MG/ML
4 INJECTION INTRAMUSCULAR; INTRAVENOUS EVERY 6 HOURS PRN
Status: DISCONTINUED | OUTPATIENT
Start: 2021-06-24 | End: 2021-06-24 | Stop reason: HOSPADM

## 2021-06-24 RX ORDER — SODIUM CHLORIDE, SODIUM LACTATE, POTASSIUM CHLORIDE, CALCIUM CHLORIDE 600; 310; 30; 20 MG/100ML; MG/100ML; MG/100ML; MG/100ML
INJECTION, SOLUTION INTRAVENOUS CONTINUOUS
Status: DISCONTINUED | OUTPATIENT
Start: 2021-06-24 | End: 2021-06-24 | Stop reason: HOSPADM

## 2021-06-24 RX ORDER — TERBUTALINE SULFATE 1 MG/ML
0.25 INJECTION, SOLUTION SUBCUTANEOUS ONCE
Status: DISCONTINUED | OUTPATIENT
Start: 2021-06-24 | End: 2021-06-24 | Stop reason: HOSPADM

## 2021-06-24 RX ORDER — ACETAMINOPHEN 325 MG/1
650 TABLET ORAL EVERY 4 HOURS PRN
Status: DISCONTINUED | OUTPATIENT
Start: 2021-06-24 | End: 2021-06-24 | Stop reason: HOSPADM

## 2021-06-24 RX ADMIN — SODIUM CHLORIDE, POTASSIUM CHLORIDE, SODIUM LACTATE AND CALCIUM CHLORIDE 500 ML: 600; 310; 30; 20 INJECTION, SOLUTION INTRAVENOUS at 04:25

## 2021-06-24 RX ADMIN — SODIUM CHLORIDE, POTASSIUM CHLORIDE, SODIUM LACTATE AND CALCIUM CHLORIDE: 600; 310; 30; 20 INJECTION, SOLUTION INTRAVENOUS at 07:01

## 2021-06-24 NOTE — PROGRESS NOTES
Dr Larry Mike notified of patient  Admission, patient with c/o of contractions since 0100, VE closed, patient is previous c/s, orders received

## 2021-06-24 NOTE — PROGRESS NOTES
Dr. Ness rounded, and evaluated patient. Will discharge home. Patient comfortable, denies contractions or pain. Perceives fetal movement. Home instructions given, patient verbalizes understanding. Discharged ambulatory in no ss distress.

## 2021-07-07 ENCOUNTER — TELEPHONE (OUTPATIENT)
Dept: OBGYN CLINIC | Age: 31
End: 2021-07-07

## 2021-07-07 NOTE — TELEPHONE ENCOUNTER
Patient called to say that Dr. Nunu Chua said that she needed seen here and delivered at [facility]. Dr. Merlyn Cotton spoke to Dr. Nunu Chua and determined that she still needs to follow Dr. Nunu Chua and that we would see her Monday here in the [facility] office and will facilitate delivery at [facility] if warranted at that time. Patient was made an appointment and a message was left about her need to call Dr. Nunu Chua office to also set up an appointment with them as well.

## 2021-07-12 ENCOUNTER — ANCILLARY PROCEDURE (OUTPATIENT)
Dept: OBGYN CLINIC | Age: 31
DRG: 540 | End: 2021-07-12
Payer: COMMERCIAL

## 2021-07-12 ENCOUNTER — ROUTINE PRENATAL (OUTPATIENT)
Dept: OBGYN CLINIC | Age: 31
DRG: 540 | End: 2021-07-12
Payer: COMMERCIAL

## 2021-07-12 VITALS
SYSTOLIC BLOOD PRESSURE: 111 MMHG | DIASTOLIC BLOOD PRESSURE: 75 MMHG | BODY MASS INDEX: 36.53 KG/M2 | WEIGHT: 240.25 LBS | HEART RATE: 95 BPM

## 2021-07-12 DIAGNOSIS — Z3A.35 35 WEEKS GESTATION OF PREGNANCY: ICD-10-CM

## 2021-07-12 DIAGNOSIS — O24.112 TYPE 2 DIABETES MELLITUS COMPLICATING PREGNANCY, ANTEPARTUM, SECOND TRIMESTER: Primary | ICD-10-CM

## 2021-07-12 DIAGNOSIS — I10 ESSENTIAL HYPERTENSION: ICD-10-CM

## 2021-07-12 DIAGNOSIS — Q76.49 CAUDAL REGRESSION SYNDROME: ICD-10-CM

## 2021-07-12 PROBLEM — Z3A.22 22 WEEKS GESTATION OF PREGNANCY: Status: RESOLVED | Noted: 2021-04-12 | Resolved: 2021-07-12

## 2021-07-12 LAB
CHP ED QC CHECK: ABNORMAL
GLUCOSE BLD-MCNC: 203 MG/DL
GLUCOSE URINE, POC: ABNORMAL
PROTEIN UA: ABNORMAL

## 2021-07-12 PROCEDURE — 82962 GLUCOSE BLOOD TEST: CPT | Performed by: OBSTETRICS & GYNECOLOGY

## 2021-07-12 PROCEDURE — 76819 FETAL BIOPHYS PROFIL W/O NST: CPT | Performed by: OBSTETRICS & GYNECOLOGY

## 2021-07-12 PROCEDURE — 99213 OFFICE O/P EST LOW 20 MIN: CPT | Performed by: OBSTETRICS & GYNECOLOGY

## 2021-07-12 PROCEDURE — 3046F HEMOGLOBIN A1C LEVEL >9.0%: CPT | Performed by: OBSTETRICS & GYNECOLOGY

## 2021-07-12 PROCEDURE — G8427 DOCREV CUR MEDS BY ELIG CLIN: HCPCS | Performed by: OBSTETRICS & GYNECOLOGY

## 2021-07-12 PROCEDURE — 2022F DILAT RTA XM EVC RTNOPTHY: CPT | Performed by: OBSTETRICS & GYNECOLOGY

## 2021-07-12 PROCEDURE — 81002 URINALYSIS NONAUTO W/O SCOPE: CPT | Performed by: OBSTETRICS & GYNECOLOGY

## 2021-07-12 PROCEDURE — 4004F PT TOBACCO SCREEN RCVD TLK: CPT | Performed by: OBSTETRICS & GYNECOLOGY

## 2021-07-12 PROCEDURE — 76816 OB US FOLLOW-UP PER FETUS: CPT | Performed by: OBSTETRICS & GYNECOLOGY

## 2021-07-12 PROCEDURE — G8419 CALC BMI OUT NRM PARAM NOF/U: HCPCS | Performed by: OBSTETRICS & GYNECOLOGY

## 2021-07-12 NOTE — PROGRESS NOTES
Subjective:      Patient ID: Justice Keen is a 26 y.o. female. ANGELO Apodaca is here for evaluation regarding Type 2 DM. The patient denies nausea, vomiting, vaginal bleeding, leakage of fluid, contractions, and/or abdominal pain. She also denies blurring of vision, visual disturbances, headaches, and /or epigastric pain. She reports the fetus to be active. Review of the systems:  Galina denies fever, chills, headaches, visual changes, stuffy/running nose, sore throat, chest pain, heart palpitations, edema, pain with breathing, shortness of breath, nausea or vomiting, difficult or painful urination, joint or muscle pain, numbness, tingling, tremors, and/or seizures. Physical Exam  Vitals signs and nursing note reviewed. /75   Pulse 95   Wt 240 lb 4 oz (109 kg)   LMP 11/03/2020   BMI 36.53 kg/m²   Constitutional: Appearance: She is not in distress  Heart rate is regular  Neck is supple with normal range of motion  No respiratory distress  Abdomen is soft  Neurological:      General: No focal deficit present. Mental Status: She is alert and oriented to person, place, and time. No calf tenderness  Psychiatric:         Mood and Affect: Mood normal.         Behavior: Behavior normal.    Fetal heart tones: Positive          Assessment:      35w6d  Type 2 DM  Poor glycemic control  Chronic hypertension on medications      Plan:        Patient Active Problem List    Diagnosis Date Noted    35 weeks gestation of pregnancy 07/12/2021     Overview Note:     1. Single living intrauterine pregnancy at 35w 6d by clinical MONET. 2. There is appropriate interval growth. 3. EFW is 90% at 3271 g  grams. 4. Amniotic fluid appeared normal.   5. Placenta is anterior without evidence of previa. 6. Anatomic survey performed as noted above, but was limited due to fetal position. However no gross anomalies were identified. 7. BPP was 8//8.          Complicated pregnancy, third trimester 2021    Type 2 diabetes mellitus complicating pregnancy, antepartum, second trimester 2021     Overview Note:     - The patient has type 2 DM for 8 years. She is on Metformin 500 mg twice daily.  - She has no diabetes logs to evaluate her glycemic control.  - Random blood glucose in February was > 200. I advised her to be admitted immediately at [facility] for immediate insulin therapy. - She is moving to [place] and will be able to be admitted in 2 to 3 days. The potential for diabetes related complications was explained. Recommendations:   1. Admission to [facility] ASAP. 2. Follow up in our office in 1 week for diabetes co-management. 3. Comprehensive anatomical fetal survey has been performed. 4. Fetal echocardiogram at 22 weeks. This may be done at [facility]. 5. Serial growth ultrasounds q 4 weeks starting at 24 weeks' gestation. 6.  fetal testing 2 times a week and daily fetal kick counts starting at 32 weeks' gestation. 7. Check TSH, free T4, urine protein:creatinine if not yet done this pregnancy. 8. Recommend delivery at 40 to 38 weeks gestation, unless earlier delivery is indicated. Vaginal delivery is acceptable if the estimated fetal weight near delivery is < 4500 grams.  delivery recommended if EFW is > 4500 grams to decrease the risk of shoulder dystocia and risk of brachial plexus injury. 9. Postpartum, recommend consistent carbohydrate diet (calories depends on breast or bottle feeding) and decreasing insulin dose to 1/3 of her insulin requirement at the end of pregnancy. Recommend prior to any subsequent pregnancies, that her HA1C < 6.5% to decrease the risk of congenital malformations. I recommend she has a follow-up appointment within one week of delivery with our diabetic team, including our dietician and lactation specialist.  10. Recommend baby aspirin      2021:  1. The patient is non compliant. High post lunch value.  No glucose log is available. 2. Recommend admission and initiation of insulin (Log 4 units before each meal and 8 units of NPH at bedtime to start with ).  3 Fetal monitoring twice daily. 4. deliver at 37 weeks by repeat CS (chronic hypertension on medications and poor glycemic control)  5. Additional Follow up as clinically indicated.  Essential hypertension 2021     Overview Note:     - She has essential hypertension on Metoprolol. Recommend:  - Baseline preeclampsia lab work is indicated (CMP, P/C ratio)  - Ophthalmological evaluation.  - EKG and echocardiogram.  - Baby aspirin a day.  Habitual aborter, antepartum 2021     Overview Note:     - She has multiple first trimester losses in the ifrst trimester    Recommend:  1. Lupus anticoagulant  2. Anticardiolipin antibodies  3. B2 glycoprotein antibodies      Caudal regression syndrome 2021     Overview Note:     The patient has a daughter with caudal regression most likely secondary to uncontrolled diabetes. Recommendations:  1. Detailed fetal anatomy scan  2. Genetic counseling      Obesity complicating peripregnancy, antepartum, second trimester 2021     Overview Note:     We reviewed that obesity complicating pregnancy is associated with increased maternal and fetal risks including: gestational diabetes, hypertensive disorders, iatrogenic  delivery, dysfunctional labor, postterm pregnancy, large for gestational age infant, shoulder dystocia, obstructive sleep apnea, postpartum hemorrhage, stillbirth, fetal anomalies,  delivery and postcesarean complications. RECOMMENDATIONS:   Nutrition consultation is recommended.  Reviewed diet, exercise, and weight gain. [place] of medicine recommend weight gain in pregnancy: Obese (all classes) > 30:  total weight gain 11-20 lbs, 0.5 lb per week in the second and third trimester.    Low dose aspirin is recommended   After  delivery, provide adequate thromboprophylaxis    If  delivery, surgical technique needs to be modified for adequate exposure and postpartum care should be modified to reduce the risk of obesity-associated complications   Lactation consultation and encourage breastfeeding   I recommended an intrauterine contraception (Mirena) due to efficacy and protective affect against uterine cancer              Thyroid dysfunction, antepartum 2021     Overview Note:     - She has hypothyroidism on no medications. I ordered TSH and Free T4 for evaluation of her thyroid function.  Hereditary disease in family possibly affecting fetus, affecting management of mother, antepartum condition or complication, single gestation 2021     Overview Note:     The patient has a sister with Down syndrome. She has a low risk cell free test in this pregnancy.  High risk pregnancy due to smoking in second trimester 2021     Overview Note:     The risks of smoking have been discussed:   PTB   PPROM   Abruption   FGR   SIDS  I urged her to quit smoking.  ASD (atrial septal defect) 2021     Overview Note:     - She has an ASD  - No surgical repair     Recommend;  Cardiology consultation  echocardiogram      Bipolar disease during pregnancy in second trimester ([facility] [address] 2021     Overview Note:     - On no medications. Recommend   Psychiatric consultation          The total patient time of the visit was 20 minutes, of which was greater than 50% of the time was spent counseling and coordinating care.            Yasmine Mooney MD

## 2021-07-12 NOTE — PROGRESS NOTES
Type 2 diabetic Fasting 97, before lunch 106  Did not bring in diabetic log random glucose =203  States baby is active Denies bleeding or contractions. Had contractions subsided, now feels possibly leaking fluids, sticky but smells musty. Doing daily kick counts    Call your obstetrician for:    Bleeding, leakage of fluid, abdominal tenderness, headaches, burred vision or  epigastric pain or decreased fetal movement or increased in urinary frequency.    Call if any questions or concerns

## 2021-07-12 NOTE — LETTER
[facility] MATERNAL FETAL MEDICINE  [address] [address]   Dept: [phone]  Loc: [phone]  Catrina Gardner MD                                           [facility] Consultation Letter     7/12/21     Kenzie Marion MD     Re: Patient: Arie Guzman  YOB: 1990    MR Number: [id number] Date of Visit: 7/12/2021     Dear Dr. Leslie Torres  I had the pleasure of seeing your patient, Arie Guzman, in consultation in the [facility] Fetal Medicine Department of [facility]). Subjective:      Patient ID: Arie Guzman is a 26 y.o. female. ANGELO  Peter Post is here for evaluation regarding Type 2 DM. The patient denies nausea, vomiting, vaginal bleeding, leakage of fluid, contractions, and/or abdominal pain. She also denies blurring of vision, visual disturbances, headaches, and /or epigastric pain. She reports the fetus to be active. Review of the systems:  Galina denies fever, chills, headaches, visual changes, stuffy/running nose, sore throat, chest pain, heart palpitations, edema, pain with breathing, shortness of breath, nausea or vomiting, difficult or painful urination, joint or muscle pain, numbness, tingling, tremors, and/or seizures. Physical Exam  Vitals signs and nursing note reviewed. /75   Pulse 95   Wt 240 lb 4 oz (109 kg)   LMP 11/03/2020   BMI 36.53 kg/m²   Constitutional: Appearance: She is not in distress  Heart rate is regular  Neck is supple with normal range of motion  No respiratory distress  Abdomen is soft  Neurological:      General: No focal deficit present. Mental Status: She is alert and oriented to person, place, and time.    No calf tenderness  Psychiatric:         Mood and Affect: Mood normal.         Behavior: Behavior normal.    Fetal heart tones: Positive          Assessment:      35w6d  Type 2 DM  Poor glycemic control  Chronic hypertension on medications      Plan:        Patient Active Problem List Diagnosis Date Noted    35 weeks gestation of pregnancy 2021     Overview Note:     1. Single living intrauterine pregnancy at 35w 6d by clinical MONET. 2. There is appropriate interval growth. 3. EFW is 90% at 3271 g  grams. 4. Amniotic fluid appeared normal.   5. Placenta is anterior without evidence of previa. 6. Anatomic survey performed as noted above, but was limited due to fetal position. However no gross anomalies were identified. 7. BPP was .  Complicated pregnancy, third trimester 2021    Type 2 diabetes mellitus complicating pregnancy, antepartum, second trimester 2021     Overview Note:     - The patient has type 2 DM for 8 years. She is on Metformin 500 mg twice daily.  - She has no diabetes logs to evaluate her glycemic control.  - Random blood glucose in February was > 200. I advised her to be admitted immediately at [facility] for immediate insulin therapy. - She is moving to [place] and will be able to be admitted in 2 to 3 days. The potential for diabetes related complications was explained. Recommendations:   1. Admission to [facility] ASA. 2. Follow up in our office in 1 week for diabetes co-management. 3. Comprehensive anatomical fetal survey has been performed. 4. Fetal echocardiogram at 22 weeks. This may be done at [facility]. 5. Serial growth ultrasounds q 4 weeks starting at 24 weeks' gestation. 6.  fetal testing 2 times a week and daily fetal kick counts starting at 32 weeks' gestation. 7. Check TSH, free T4, urine protein:creatinine if not yet done this pregnancy. 8. Recommend delivery at 40 to 38 weeks gestation, unless earlier delivery is indicated. Vaginal delivery is acceptable if the estimated fetal weight near delivery is < 4500 grams.  delivery recommended if EFW is > 4500 grams to decrease the risk of shoulder dystocia and risk of brachial plexus injury.   9. Postpartum, recommend consistent carbohydrate diet infant, shoulder dystocia, obstructive sleep apnea, postpartum hemorrhage, stillbirth, fetal anomalies,  delivery and postcesarean complications. RECOMMENDATIONS:   Nutrition consultation is recommended.  Reviewed diet, exercise, and weight gain. [place] of medicine recommend weight gain in pregnancy: Obese (all classes) > 30:  total weight gain 11-20 lbs, 0.5 lb per week in the second and third trimester.  Low dose aspirin is recommended   After  delivery, provide adequate thromboprophylaxis    If  delivery, surgical technique needs to be modified for adequate exposure and postpartum care should be modified to reduce the risk of obesity-associated complications   Lactation consultation and encourage breastfeeding   I recommended an intrauterine contraception (Mirena) due to efficacy and protective affect against uterine cancer              Thyroid dysfunction, antepartum 2021     Overview Note:     - She has hypothyroidism on no medications. I ordered TSH and Free T4 for evaluation of her thyroid function.  Hereditary disease in family possibly affecting fetus, affecting management of mother, antepartum condition or complication, single gestation 2021     Overview Note:     The patient has a sister with Down syndrome. She has a low risk cell free test in this pregnancy.  High risk pregnancy due to smoking in second trimester 2021     Overview Note:     The risks of smoking have been discussed:   PTB   PPROM   Abruption   FGR   SIDS  I urged her to quit smoking.  ASD (atrial septal defect) 2021     Overview Note:     - She has an ASD  - No surgical repair     Recommend;  Cardiology consultation  echocardiogram      Bipolar disease during pregnancy in second trimester ([facility] [address] 2021     Overview Note:     - On no medications.   Recommend   Psychiatric consultation          The total patient time of the visit was 20 minutes, of which was greater than 50% of the time was spent counseling and coordinating care. Bhupendra Brambila MD          Thank you for allowing us to participate in the care of your patient. Should you or the family have any questions or concerns, please do not hesitate to contact us.     Sincerely,    Bhupendra Brambila MD

## 2021-07-12 NOTE — LETTER
[facility] MATERNAL FETAL MEDICINE  [address] [address]   Dept: [phone]  Loc: [phone]  Lesvia Shen MD                                           [facility] Consultation Letter     7/12/21     Sushil Kaufman MD     Re: Patient: Afia Francois  YOB: 1990    MR Number: [id number] Date of Visit: 7/12/2021     Dear Dr. Donneta Lanes  I had the pleasure of seeing your patient, Afia Francois, in consultation in the [facility] Fetal Medicine Department of [facility]). Subjective:      Patient ID: Afia Francois is a 26 y.o. female. HPI  Yudy Posada is here for evaluation regarding Type 2 DM. The patient denies nausea, vomiting, vaginal bleeding, leakage of fluid, contractions, and/or abdominal pain. She also denies blurring of vision, visual disturbances, headaches, and /or epigastric pain. She reports the fetus to be active. Review of the systems:  Galina denies fever, chills, headaches, visual changes, stuffy/running nose, sore throat, chest pain, heart palpitations, edema, pain with breathing, shortness of breath, nausea or vomiting, difficult or painful urination, joint or muscle pain, numbness, tingling, tremors, and/or seizures. Physical Exam  Vitals signs and nursing note reviewed. /75   Pulse 95   Wt 240 lb 4 oz (109 kg)   LMP 11/03/2020   BMI 36.53 kg/m²   Constitutional: Appearance: She is not in distress  Heart rate is regular  Neck is supple with normal range of motion  No respiratory distress  Abdomen is soft  Neurological:      General: No focal deficit present. Mental Status: She is alert and oriented to person, place, and time.    No calf tenderness  Psychiatric:         Mood and Affect: Mood normal.         Behavior: Behavior normal.    Fetal heart tones: Positive          Assessment:      35w6d  Type 2 DM  Poor glycemic control  Chronic hypertension on medications      Plan:        Patient Active Problem List Diagnosis Date Noted    35 weeks gestation of pregnancy 2021     Overview Note:     1. Single living intrauterine pregnancy at 35w 6d by clinical MONET. 2. There is appropriate interval growth. 3. EFW is 90% at 3271 g  grams. 4. Amniotic fluid appeared normal.   5. Placenta is anterior without evidence of previa. 6. Anatomic survey performed as noted above, but was limited due to fetal position. However no gross anomalies were identified. 7. BPP was .  Complicated pregnancy, third trimester 2021    Type 2 diabetes mellitus complicating pregnancy, antepartum, second trimester 2021     Overview Note:     - The patient has type 2 DM for 8 years. She is on Metformin 500 mg twice daily.  - She has no diabetes logs to evaluate her glycemic control.  - Random blood glucose in February was > 200. I advised her to be admitted immediately at [facility] for immediate insulin therapy. - She is moving to [place] and will be able to be admitted in 2 to 3 days. The potential for diabetes related complications was explained. Recommendations:   1. Admission to [facility] ASA. 2. Follow up in our office in 1 week for diabetes co-management. 3. Comprehensive anatomical fetal survey has been performed. 4. Fetal echocardiogram at 22 weeks. This may be done at [facility]. 5. Serial growth ultrasounds q 4 weeks starting at 24 weeks' gestation. 6.  fetal testing 2 times a week and daily fetal kick counts starting at 32 weeks' gestation. 7. Check TSH, free T4, urine protein:creatinine if not yet done this pregnancy. 8. Recommend delivery at 40 to 38 weeks gestation, unless earlier delivery is indicated. Vaginal delivery is acceptable if the estimated fetal weight near delivery is < 4500 grams.  delivery recommended if EFW is > 4500 grams to decrease the risk of shoulder dystocia and risk of brachial plexus injury.   9. Postpartum, recommend consistent carbohydrate diet (calories depends on breast or bottle feeding) and decreasing insulin dose to 1/3 of her insulin requirement at the end of pregnancy. Recommend prior to any subsequent pregnancies, that her HA1C < 6.5% to decrease the risk of congenital malformations. I recommend she has a follow-up appointment within one week of delivery with our diabetic team, including our dietician and lactation specialist.  10. Recommend baby aspirin      2021:  1. The patient is non compliant. High post lunch value. No glucose log is available. 2. Recommend admission and initiation of insulin (Log 4 units before each meal and 8 units of NPH at bedtime to start with ).  3 Fetal monitoring twice daily. 4. deliver at 37 weeks by repeat CS (chronic hypertension on medications and poor glycemic control)  5. Additional Follow up as clinically indicated.  Essential hypertension 2021     Overview Note:     - She has essential hypertension on Metoprolol. Recommend:  - Baseline preeclampsia lab work is indicated (CMP, P/C ratio)  - Ophthalmological evaluation.  - EKG and echocardiogram.  - Baby aspirin a day.  Habitual aborter, antepartum 2021     Overview Note:     - She has multiple first trimester losses in the ifrst trimester    Recommend:  1. Lupus anticoagulant  2. Anticardiolipin antibodies  3. B2 glycoprotein antibodies      Caudal regression syndrome 2021     Overview Note:     The patient has a daughter with caudal regression most likely secondary to uncontrolled diabetes. Recommendations:  1. Detailed fetal anatomy scan  2.  Genetic counseling      Obesity complicating peripregnancy, antepartum, second trimester 2021     Overview Note:     We reviewed that obesity complicating pregnancy is associated with increased maternal and fetal risks including: gestational diabetes, hypertensive disorders, iatrogenic  delivery, dysfunctional labor, postterm pregnancy, large for gestational age was greater than 50% of the time was spent counseling and coordinating care. Breanne Allen MD          Thank you for allowing us to participate in the care of your patient. Should you or the family have any questions or concerns, please do not hesitate to contact us.     Sincerely,    Breanne Allen MD

## 2021-07-12 NOTE — PATIENT INSTRUCTIONS
Patient Education        Weeks 34 to 39 of Your Pregnancy: Care Instructions  Overview     By now, your baby and your belly have grown quite large. It's almost time to give birth! Your baby's lungs are almost ready to breathe air. The skull bones are firm enough to protect your baby's head, but soft enough to move down through the birth canal.  You may be feeling excited and happy at times--but also anxious or scared. You might wonder how you'll know if you're in labor or what to expect during labor. Try to be open and flexible in your expectations of the birth. Because each birth is different, there's no way to know exactly what childbirth will be like for you. Talk to your doctor or midwife about any concerns you have. If you haven't already had the Tdap shot during this pregnancy, talk to your doctor about getting it. It will help protect your  against pertussis infection. In the 36th week, most women have a test for group B streptococcus (GBS). GBS is a common bacteria that can live in the vagina and rectum. It can make your baby sick after birth. If you test positive, you will get antibiotics during labor. The medicine will help keep your baby from getting the bacteria. Follow-up care is a key part of your treatment and safety. Be sure to make and go to all appointments, and call your doctor if you are having problems. It's also a good idea to know your test results and keep a list of the medicines you take. How can you care for yourself at home? Learn about pain relief choices  · Pain is different for every woman. Talk with your doctor about your feelings about pain. · You can choose from several types of pain relief. These include medicine or breathing techniques, as well as comfort measures. You can use more than one option. · If you choose to have pain medicine during labor, talk to your doctor about your options. Some medicines lower anxiety and help with some of the pain.  Others make your lower body numb so that you won't feel pain. · Be sure to tell your doctor about your pain medicine choice before you start labor or very early in your labor. You may be able to change your mind as labor progresses. · Rarely, a woman is put to sleep by medicine given through a mask or an IV. Labor and delivery  · The first stage of labor has three parts: early, active, and transition. ? Most women have early labor at home. You can stay busy or rest, eat light snacks, drink clear fluids, and start counting contractions. ? When talking during a contraction gets hard, you may be moving to active labor. During active labor, you should head for the hospital if you are not there already. ? You are in active labor when contractions come every 3 to 4 minutes and last about 60 seconds. Your cervix is opening more rapidly. ? If your water breaks, contractions will come faster and stronger. ? During transition, your cervix is stretching, and contractions are coming more rapidly. ? You may want to push, but your cervix might not be ready. Your doctor will tell you when to push. · The second stage starts when your cervix is completely opened and you are ready to push. ? Contractions are very strong to push the baby down the birth canal.  ? You will feel the urge to push. You may feel like you need to have a bowel movement. ? You may be coached to push with contractions. These contractions will be very strong, but you will not have them as often. You can get a little rest between contractions. ? You may be emotional and irritable. You may not be aware of what is going on around you.  ? One last push, and your baby is born. · The third stage is when a few more contractions push out the placenta. This may take 30 minutes or less. · The fourth stage is the welcome recovery. You may feel overwhelmed with emotions and exhausted but alert. This is a good time to start breastfeeding. Where can you learn more?   Go to [email or web address] and sign in to your [employer] account. Enter G009 in the [facility] box to learn more about \"Weeks 34 to 36 of Your Pregnancy: Care Instructions. \"     If you do not have an account, please click on the \"Sign Up Now\" link. Current as of: October 8, 2020               Content Version: 12.9  © 2006-2021 [employer]. Care instructions adapted under license by [email or web address] ([facility]). If you have questions about a medical condition or this instruction, always ask your healthcare professional. [place] [address] any warranty or liability for your use of this information. Patient Education        Learning About When to Call Your Doctor During Pregnancy (After 20 Weeks)  Your Care Instructions  It's common to have concerns about what might be a problem during pregnancy. Although most pregnant women don't have any serious problems, it's important to know when to call your doctor if you have certain symptoms or signs of labor. These are general suggestions. Your doctor may give you some more information about when to call. When to call your doctor (after 20 weeks)  Call 911  anytime you think you may need emergency care. For example, call if:  · You have severe vaginal bleeding. · You have sudden, severe pain in your belly. · You passed out (lost consciousness). · You have a seizure. · You see or feel the umbilical cord. · You think you are about to deliver your baby and can't make it safely to the hospital.  Call your doctor now or seek immediate medical care if:  · You have vaginal bleeding. · You have belly pain. · You have a fever. · You have symptoms of preeclampsia, such as:  ? Sudden swelling of your face, hands, or feet. ? New vision problems (such as dimness, blurring, or seeing spots). ? A severe headache. · You have a sudden release of fluid from your vagina.  (You think your water broke.)  · You think that you may be in labor. This means that you've had at least 6 contractions in an hour. · You notice that your baby has stopped moving or is moving much less than normal.  · You have symptoms of a urinary tract infection. These may include:  ? Pain or burning when you urinate. ? A frequent need to urinate without being able to pass much urine. ? Pain in the flank, which is just below the rib cage and above the waist on either side of the back. ? Blood in your urine. Watch closely for changes in your health, and be sure to contact your doctor if:  · You have vaginal discharge that smells bad. · You have skin changes, such as:  ? A rash. ? Itching. ? Yellow color to your skin. · You have other concerns about your pregnancy. If you have labor signs at 37 weeks or more  If you have signs of labor at 37 weeks or more, your doctor may tell you to call when your labor becomes more active. Symptoms of active labor include:  · Contractions that are regular. · Contractions that are less than 5 minutes apart. · Contractions that are hard to talk through. Follow-up care is a key part of your treatment and safety. Be sure to make and go to all appointments, and call your doctor if you are having problems. It's also a good idea to know your test results and keep a list of the medicines you take. Where can you learn more? Go to [email or web address]://[email or web address]. org and sign in to your [employer] account. Enter  in the [facility] box to learn more about \"Learning About When to Call Your Doctor During Pregnancy (After 20 Weeks). \"     If you do not have an account, please click on the \"Sign Up Now\" link. Current as of: October 8, 2020               Content Version: 12.9  © 9632-2855 Healthwise, [employer]. Care instructions adapted under license by [facility] ([facility]).  If you have questions about a medical condition or this instruction, always ask your healthcare professional. [address] any Search Health Information box to learn more about \"Counting Your Baby's Kicks: Care Instructions. \"     If you do not have an account, please click on the \"Sign Up Now\" link. Current as of: October 8, 2020               Content Version: 12.9  © 9082-1084 Healthwise, Inc[employer]. Care instructions adapted under license by [facility] ([facility]). If you have questions about a medical condition or this instruction, always ask your healthcare professional. [address] any warranty or liability for your use of this information.

## 2021-07-13 ENCOUNTER — HOSPITAL ENCOUNTER (INPATIENT)
Age: 31
LOS: 9 days | Discharge: HOME | DRG: 540 | End: 2021-07-23
Attending: OBSTETRICS & GYNECOLOGY | Admitting: OBSTETRICS & GYNECOLOGY
Payer: COMMERCIAL

## 2021-07-13 LAB
ALBUMIN SERPL-MCNC: 2.9 G/DL (ref 3.5–5.2)
ALP BLD-CCNC: 174 U/L (ref 35–104)
ALT SERPL-CCNC: 7 U/L (ref 0–32)
ANION GAP SERPL CALCULATED.3IONS-SCNC: 12 MMOL/L (ref 7–16)
AST SERPL-CCNC: 13 U/L (ref 0–31)
BACTERIA: ABNORMAL /HPF
BILIRUB SERPL-MCNC: <0.2 MG/DL (ref 0–1.2)
BILIRUBIN URINE: NEGATIVE
BLOOD, URINE: ABNORMAL
BUN BLDV-MCNC: 10 MG/DL (ref 6–20)
CALCIUM SERPL-MCNC: 9 MG/DL (ref 8.6–10.2)
CHLORIDE BLD-SCNC: 104 MMOL/L (ref 98–107)
CLARITY: ABNORMAL
CO2: 18 MMOL/L (ref 22–29)
COLOR: YELLOW
CREAT SERPL-MCNC: 0.8 MG/DL (ref 0.5–1)
EPITHELIAL CELLS, UA: ABNORMAL /HPF
GFR AFRICAN AMERICAN: >60
GFR NON-AFRICAN AMERICAN: >60 ML/MIN/1.73
GLUCOSE BLD-MCNC: 214 MG/DL (ref 74–99)
GLUCOSE URINE: >=1000 MG/DL
HCT VFR BLD CALC: 36.5 % (ref 34–48)
HEMOGLOBIN: 12.4 G/DL (ref 11.5–15.5)
KETONES, URINE: 15 MG/DL
LEUKOCYTE ESTERASE, URINE: ABNORMAL
MCH RBC QN AUTO: 31.6 PG (ref 26–35)
MCHC RBC AUTO-ENTMCNC: 34 % (ref 32–34.5)
MCV RBC AUTO: 93.1 FL (ref 80–99.9)
METER GLUCOSE: 243 MG/DL (ref 74–99)
NITRITE, URINE: NEGATIVE
PDW BLD-RTO: 12.9 FL (ref 11.5–15)
PH UA: 6.5 (ref 5–9)
PLATELET # BLD: 253 E9/L (ref 130–450)
PMV BLD AUTO: 11.5 FL (ref 7–12)
POTASSIUM SERPL-SCNC: 4.1 MMOL/L (ref 3.5–5)
PROTEIN UA: ABNORMAL MG/DL
RBC # BLD: 3.92 E12/L (ref 3.5–5.5)
RBC UA: ABNORMAL /HPF (ref 0–2)
SODIUM BLD-SCNC: 134 MMOL/L (ref 132–146)
SPECIFIC GRAVITY UA: 1.01 (ref 1–1.03)
TOTAL PROTEIN: 6.4 G/DL (ref 6.4–8.3)
TRICHOMONAS: PRESENT /HPF
URIC ACID, SERUM: 4.8 MG/DL (ref 2.4–5.7)
UROBILINOGEN, URINE: 0.2 E.U./DL
WBC # BLD: 9.9 E9/L (ref 4.5–11.5)
WBC UA: ABNORMAL /HPF (ref 0–5)

## 2021-07-13 PROCEDURE — 96361 HYDRATE IV INFUSION ADD-ON: CPT

## 2021-07-13 PROCEDURE — 85027 COMPLETE CBC AUTOMATED: CPT

## 2021-07-13 PROCEDURE — 2580000003 HC RX 258: Performed by: OBSTETRICS & GYNECOLOGY

## 2021-07-13 PROCEDURE — 96360 HYDRATION IV INFUSION INIT: CPT

## 2021-07-13 PROCEDURE — 84550 ASSAY OF BLOOD/URIC ACID: CPT

## 2021-07-13 PROCEDURE — 36415 COLL VENOUS BLD VENIPUNCTURE: CPT

## 2021-07-13 PROCEDURE — 81001 URINALYSIS AUTO W/SCOPE: CPT

## 2021-07-13 PROCEDURE — G0378 HOSPITAL OBSERVATION PER HR: HCPCS

## 2021-07-13 PROCEDURE — 84112 EVAL AMNIOTIC FLUID PROTEIN: CPT

## 2021-07-13 PROCEDURE — 6370000000 HC RX 637 (ALT 250 FOR IP): Performed by: OBSTETRICS & GYNECOLOGY

## 2021-07-13 PROCEDURE — 82962 GLUCOSE BLOOD TEST: CPT

## 2021-07-13 PROCEDURE — 80053 COMPREHEN METABOLIC PANEL: CPT

## 2021-07-13 RX ORDER — ACETAMINOPHEN 325 MG/1
650 TABLET ORAL EVERY 4 HOURS PRN
Status: DISCONTINUED | OUTPATIENT
Start: 2021-07-13 | End: 2021-07-20 | Stop reason: SDUPTHER

## 2021-07-13 RX ORDER — SODIUM CHLORIDE, SODIUM LACTATE, POTASSIUM CHLORIDE, CALCIUM CHLORIDE 600; 310; 30; 20 MG/100ML; MG/100ML; MG/100ML; MG/100ML
INJECTION, SOLUTION INTRAVENOUS CONTINUOUS
Status: DISCONTINUED | OUTPATIENT
Start: 2021-07-13 | End: 2021-07-20 | Stop reason: SDUPTHER

## 2021-07-13 RX ADMIN — SODIUM CHLORIDE, POTASSIUM CHLORIDE, SODIUM LACTATE AND CALCIUM CHLORIDE: 600; 310; 30; 20 INJECTION, SOLUTION INTRAVENOUS at 21:26

## 2021-07-13 RX ADMIN — ACETAMINOPHEN 650 MG: 325 TABLET ORAL at 23:59

## 2021-07-13 ASSESSMENT — PAIN SCALES - GENERAL: PAINLEVEL_OUTOF10: 10

## 2021-07-14 PROBLEM — O26.93 COMPLICATION OF PREGNANCY, THIRD TRIMESTER: Status: ACTIVE | Noted: 2021-07-14

## 2021-07-14 LAB
METER GLUCOSE: 115 MG/DL (ref 74–99)
METER GLUCOSE: 122 MG/DL (ref 74–99)
METER GLUCOSE: 134 MG/DL (ref 74–99)
METER GLUCOSE: 149 MG/DL (ref 74–99)

## 2021-07-14 PROCEDURE — 2580000003 HC RX 258: Performed by: OBSTETRICS & GYNECOLOGY

## 2021-07-14 PROCEDURE — 1220000001 HC SEMI PRIVATE L&D R&B

## 2021-07-14 PROCEDURE — 96361 HYDRATE IV INFUSION ADD-ON: CPT

## 2021-07-14 PROCEDURE — 96360 HYDRATION IV INFUSION INIT: CPT

## 2021-07-14 PROCEDURE — 82962 GLUCOSE BLOOD TEST: CPT

## 2021-07-14 PROCEDURE — 6370000000 HC RX 637 (ALT 250 FOR IP): Performed by: OBSTETRICS & GYNECOLOGY

## 2021-07-14 PROCEDURE — 96372 THER/PROPH/DIAG INJ SC/IM: CPT

## 2021-07-14 RX ADMIN — INSULIN HUMAN 8 UNITS: 100 INJECTION, SUSPENSION SUBCUTANEOUS at 21:19

## 2021-07-14 RX ADMIN — INSULIN HUMAN 4 UNITS: 100 INJECTION, SOLUTION PARENTERAL at 08:36

## 2021-07-14 RX ADMIN — INSULIN HUMAN 4 UNITS: 100 INJECTION, SOLUTION PARENTERAL at 17:01

## 2021-07-14 RX ADMIN — INSULIN HUMAN 4 UNITS: 100 INJECTION, SOLUTION PARENTERAL at 12:02

## 2021-07-14 RX ADMIN — INSULIN HUMAN 8 UNITS: 100 INJECTION, SUSPENSION SUBCUTANEOUS at 00:00

## 2021-07-14 RX ADMIN — SODIUM CHLORIDE, POTASSIUM CHLORIDE, SODIUM LACTATE AND CALCIUM CHLORIDE: 600; 310; 30; 20 INJECTION, SOLUTION INTRAVENOUS at 12:28

## 2021-07-14 ASSESSMENT — PAIN SCALES - GENERAL: PAINLEVEL_OUTOF10: 0

## 2021-07-14 NOTE — PROGRESS NOTES
25 yo  ruy 8/10/21 36w ambulatory into [facility] stating that M Dr Ab Abdi sent her for blood glucose monitoring, blood pressure monitoring, and fetal monitoring and for a c/s to be scheduled for . Patient has no paperwork or prescriptions from Dr Ab Abdi. Patient states DR Villar is aware. Patient denies any ctx or vaginal bleeding, but states having \"wetness\" all day. amnisure negative. Patient states she is a Type 2 DM and takes metformin twice a day with blood sugars between  but has not taken it today. BG is 243 on glucometer. Patient states she has chronic hypertension and takes metoprolol for it. VSS. Patient denies any pain and perceives adequate fetal movement. Patient states she has not been seeing Dr Shari Paget regularly because she has been staying in [place]. efm placed, assessed and monitored every 15 minutes.

## 2021-07-14 NOTE — PROGRESS NOTES
Assumed care of patient for this shift. Patient resting comfortably at this time. Tolerated general diet. perceives fetal movement. Denies vaginal bleeding, leaking of fluids, abd pain, cramping, and contractions.

## 2021-07-15 LAB
METER GLUCOSE: 105 MG/DL (ref 74–99)
METER GLUCOSE: 111 MG/DL (ref 74–99)
METER GLUCOSE: 160 MG/DL (ref 74–99)
METER GLUCOSE: 79 MG/DL (ref 74–99)

## 2021-07-15 PROCEDURE — 1220000001 HC SEMI PRIVATE L&D R&B

## 2021-07-15 PROCEDURE — 82962 GLUCOSE BLOOD TEST: CPT

## 2021-07-15 PROCEDURE — 6370000000 HC RX 637 (ALT 250 FOR IP): Performed by: OBSTETRICS & GYNECOLOGY

## 2021-07-15 RX ADMIN — INSULIN HUMAN 8 UNITS: 100 INJECTION, SUSPENSION SUBCUTANEOUS at 22:06

## 2021-07-15 RX ADMIN — INSULIN HUMAN 4 UNITS: 100 INJECTION, SOLUTION PARENTERAL at 18:06

## 2021-07-15 RX ADMIN — INSULIN HUMAN 4 UNITS: 100 INJECTION, SOLUTION PARENTERAL at 08:18

## 2021-07-15 RX ADMIN — INSULIN HUMAN 4 UNITS: 100 INJECTION, SOLUTION PARENTERAL at 12:05

## 2021-07-15 NOTE — PROGRESS NOTES
Assumed care of patient at this time. EFM applied for shift. No complaints at this time.  Call light within reach

## 2021-07-16 LAB
METER GLUCOSE: 109 MG/DL (ref 74–99)
METER GLUCOSE: 137 MG/DL (ref 74–99)
METER GLUCOSE: 94 MG/DL (ref 74–99)
METER GLUCOSE: 98 MG/DL (ref 74–99)

## 2021-07-16 PROCEDURE — 82962 GLUCOSE BLOOD TEST: CPT

## 2021-07-16 PROCEDURE — 1220000001 HC SEMI PRIVATE L&D R&B

## 2021-07-16 PROCEDURE — 6370000000 HC RX 637 (ALT 250 FOR IP): Performed by: OBSTETRICS & GYNECOLOGY

## 2021-07-16 RX ADMIN — INSULIN HUMAN 4 UNITS: 100 INJECTION, SOLUTION PARENTERAL at 08:08

## 2021-07-16 RX ADMIN — INSULIN HUMAN 4 UNITS: 100 INJECTION, SOLUTION PARENTERAL at 17:15

## 2021-07-16 RX ADMIN — INSULIN HUMAN 4 UNITS: 100 INJECTION, SOLUTION PARENTERAL at 12:12

## 2021-07-16 RX ADMIN — INSULIN HUMAN 8 UNITS: 100 INJECTION, SUSPENSION SUBCUTANEOUS at 23:21

## 2021-07-16 ASSESSMENT — PAIN SCALES - GENERAL: PAINLEVEL_OUTOF10: 0

## 2021-07-16 NOTE — PROGRESS NOTES
Assumed care of patient for the 11pm-7am shift. Plan of care for night discussed patient verbalizes understanding. Call light placed within reach.

## 2021-07-17 LAB
METER GLUCOSE: 120 MG/DL (ref 74–99)
METER GLUCOSE: 190 MG/DL (ref 74–99)
METER GLUCOSE: 76 MG/DL (ref 74–99)
METER GLUCOSE: 87 MG/DL (ref 74–99)

## 2021-07-17 PROCEDURE — 6370000000 HC RX 637 (ALT 250 FOR IP): Performed by: OBSTETRICS & GYNECOLOGY

## 2021-07-17 PROCEDURE — 1220000001 HC SEMI PRIVATE L&D R&B

## 2021-07-17 PROCEDURE — 82962 GLUCOSE BLOOD TEST: CPT

## 2021-07-17 RX ADMIN — INSULIN HUMAN 8 UNITS: 100 INJECTION, SUSPENSION SUBCUTANEOUS at 22:45

## 2021-07-17 RX ADMIN — INSULIN HUMAN 4 UNITS: 100 INJECTION, SOLUTION PARENTERAL at 08:13

## 2021-07-17 RX ADMIN — INSULIN HUMAN 4 UNITS: 100 INJECTION, SOLUTION PARENTERAL at 14:16

## 2021-07-17 NOTE — PROGRESS NOTES
Patient forgot to call nurse before dinner, ate around 1230. POC and insulin at this time. Patient has no co's. Perceives fetal movement. Resting.

## 2021-07-17 NOTE — H&P
Subjective:      Justice Tomlinson is an 26 y.o. female at 39 and 0/7 weeks gestation presenting with   Chief Complaint   Patient presents with    Hypertension     sent by [facility]    Patient was seen by Dr. Ab Abdi from maternal-fetal medicine yesterday and he advised admission yesterday for control of her blood sugar. Patient is extremely noncompliant with prenatal care or her insulin therapy. Had a previous history of diabetes complications in the previous pregnancy which resulted in a . Patient presented today to labor and delivery instead of yesterday for blood sugar control. Per MFM, she is to be started on insulin and kept in the hospital till delivery at 37 weeks. Denies any bleeding or fluid leak. Reports good fetal movement. Denies any significant contractions. .  Past Medical History:   Diagnosis Date    Anxiety     Bipolar 1 disorder ([address]     Depression     Diabetes mellitus ([address]     FH: multiple miscarriages or stillbirths     Hypertension     Septal defect, atrial     Shingles     Thyroid disease        Review of Systems  Pertinent items are noted in HPI. Objective:      /60   Pulse 84   Temp 98.1 °F (36.7 °C) (Oral)   Resp 16   LMP 2020   Blood pressure 114/60, pulse 84, temperature 98.1 °F (36.7 °C), temperature source Oral, resp. rate 16, last menstrual period 2020, unknown if currently breastfeeding. General:   alert, appears stated age and cooperative   Cervix:   Deferred.    FHT:   135 BPM     Lab Review    CBC:   Lab Results   Component Value Date    WBC 9.9 2021    RBC 3.92 2021    HGB 12.4 2021    HCT 36.5 2021    MCV 93.1 2021    MCH 31.6 2021    MCHC 34.0 2021    RDW 12.9 2021     2021    MPV 11.5 2021     CMP:    Lab Results   Component Value Date     2021    K 4.1 2021    K 4.5 2020     2021    CO2 18 2021    BUN 10 2021 CREATININE 0.8 2021    GFRAA >60 2021    LABGLOM >60 2021    GLUCOSE 214 2021    GLUCOSE 302 2012    PROT 6.4 2021    LABALBU 2.9 2021    LABALBU 4.4 2012    CALCIUM 9.0 2021    BILITOT <0.2 2021    ALKPHOS 174 2021    AST 13 2021    ALT 7 2021     U/A:    Lab Results   Component Value Date    COLORU Yellow 2021    PHUR 6.5 2021    WBCUA 5-10 2021    RBCUA 0-1 2021    TRICHOMONAS Present 2021    BACTERIA MODERATE 2021    CLARITYU SLCLOUDY 2021    SPECGRAV 1.015 2021    LEUKOCYTESUR MODERATE 2021    UROBILINOGEN 0.2 2021    BILIRUBINUR Negative 2021    BLOODU TRACE 2021    GLUCOSEU >=1000 2021          Assessment:     36 weeks gestation  Poorly controlled gestational diabetic  Poorly controlled hypertensive  Previous     Plan:     Admit to labor and delivery  Initiate insulin therapy with 8 units of NPH at bedtime and 4 units of regular insulin before each meal.  Will check blood sugars 1 hour after each meal and fasting  Repeat  at 37 weeks    Paz Villar MD,MD,2021 10:50 AM

## 2021-07-17 NOTE — PROGRESS NOTES
No complaints. Blood pressure 114/60, pulse 84, temperature 98.1 °F (36.7 °C), temperature source Oral, resp. rate 16, last menstrual period 2020, unknown if currently breastfeeding.      Blood sugars are within normal limits  Fetal heart tones: Reactive  [place]: No contractions    Assessment  36 weeks and 3-day pregnancy  Gestational diabetes, type II under better control  Previous     Plan  Continue current insulin therapy  Repeat  at 37 weeks

## 2021-07-17 NOTE — PROGRESS NOTES
RN assuming care of patient. VSS. Up in room as tolerated. Denies S&S labor. Voiding QS with no c/o difficulties. In good spirits. Plan of care discussed for day-patient agreeable. Call bell in reach.

## 2021-07-17 NOTE — PROGRESS NOTES
Patient dozing intermittently throughout the day, no co's offered. Instructed to call for POC glucose before dinner.

## 2021-07-17 NOTE — PROGRESS NOTES
Doing well with no complaints. Reports good fetal movement. Blood pressure 114/60, pulse 84, temperature 98.1 °F (36.7 °C), temperature source Oral, resp. rate 16, last menstrual period 2020, unknown if currently breastfeeding. Blood sugars are in acceptable range. Patient is being monitored 3 times a day and no contractions or fetal heart tone abnormalities were noted.       Assessment  36 weeks and 2 day pregnancy  Gestational diabetes, under better control  Previous     Plan  Continue current insulin therapy  Continue with fetal monitoring 3 times a day

## 2021-07-17 NOTE — PROGRESS NOTES
RN to bedside, assessment completed and WNL. Patient given her HS dose of insulin per order with 2nd RN witness of dose and order. Patient helped to restroom, changed her entire bed as there was some stool incontinence noted on sheets. EFM placed back on patient for intermittent monitoring per order. Patient denies pain, LOF/Vaginal bleeding or contractions. Patient given fresh water, sugar free jello and ice chips. Call light in reach after discussion of POC.

## 2021-07-17 NOTE — PROGRESS NOTES
Breakfast taken well. Remains comfortable and resting now. EFM applied for intermittent tracing orders.  States fetus is active

## 2021-07-18 LAB
METER GLUCOSE: 109 MG/DL (ref 74–99)
METER GLUCOSE: 113 MG/DL (ref 74–99)
METER GLUCOSE: 116 MG/DL (ref 74–99)
METER GLUCOSE: 97 MG/DL (ref 74–99)

## 2021-07-18 PROCEDURE — 82962 GLUCOSE BLOOD TEST: CPT

## 2021-07-18 PROCEDURE — 6370000000 HC RX 637 (ALT 250 FOR IP): Performed by: OBSTETRICS & GYNECOLOGY

## 2021-07-18 PROCEDURE — 1220000001 HC SEMI PRIVATE L&D R&B

## 2021-07-18 RX ADMIN — INSULIN HUMAN 4 UNITS: 100 INJECTION, SOLUTION PARENTERAL at 08:15

## 2021-07-18 RX ADMIN — INSULIN HUMAN 8 UNITS: 100 INJECTION, SUSPENSION SUBCUTANEOUS at 22:38

## 2021-07-18 RX ADMIN — INSULIN HUMAN 4 UNITS: 100 INJECTION, SOLUTION PARENTERAL at 12:12

## 2021-07-18 RX ADMIN — INSULIN HUMAN 4 UNITS: 100 INJECTION, SOLUTION PARENTERAL at 17:53

## 2021-07-18 NOTE — PROGRESS NOTES
Doing well with no major complaints. Starting to feel little bit bored however she understands the importance of staying in the hospital delivery. Blood pressure 121/67, pulse 85, temperature 98.7 °F (37.1 °C), resp. rate 18, last menstrual period 2020, unknown if currently breastfeeding.      Blood sugars are within good control limits    Fetal heart tones: Reactive  [place]: No contractions     Assessment  36 weeks and 5-day pregnancy  Gestational diabetes under better control  Previous     Plan  Repeat  at 37 weeks

## 2021-07-19 LAB
ABO/RH: NORMAL
ALBUMIN SERPL-MCNC: 3 G/DL (ref 3.5–5.2)
ALP BLD-CCNC: 197 U/L (ref 35–104)
ALT SERPL-CCNC: 5 U/L (ref 0–32)
AMPHETAMINE SCREEN, URINE: NOT DETECTED
ANION GAP SERPL CALCULATED.3IONS-SCNC: 11 MMOL/L (ref 7–16)
ANTIBODY SCREEN: NORMAL
AST SERPL-CCNC: 17 U/L (ref 0–31)
BARBITURATE SCREEN URINE: NOT DETECTED
BASOPHILS ABSOLUTE: 0.03 E9/L (ref 0–0.2)
BASOPHILS RELATIVE PERCENT: 0.3 % (ref 0–2)
BENZODIAZEPINE SCREEN, URINE: NOT DETECTED
BILIRUB SERPL-MCNC: <0.2 MG/DL (ref 0–1.2)
BUN BLDV-MCNC: 10 MG/DL (ref 6–20)
CALCIUM SERPL-MCNC: 9.4 MG/DL (ref 8.6–10.2)
CANNABINOID SCREEN URINE: POSITIVE
CHLORIDE BLD-SCNC: 106 MMOL/L (ref 98–107)
CO2: 20 MMOL/L (ref 22–29)
COCAINE METABOLITE SCREEN URINE: NOT DETECTED
CREAT SERPL-MCNC: 0.5 MG/DL (ref 0.5–1)
EOSINOPHILS ABSOLUTE: 0.13 E9/L (ref 0.05–0.5)
EOSINOPHILS RELATIVE PERCENT: 1.3 % (ref 0–6)
FENTANYL SCREEN, URINE: NOT DETECTED
GFR AFRICAN AMERICAN: >60
GFR NON-AFRICAN AMERICAN: >60 ML/MIN/1.73
GLUCOSE BLD-MCNC: 119 MG/DL (ref 74–99)
HCT VFR BLD CALC: 36.1 % (ref 34–48)
HEMOGLOBIN: 12.3 G/DL (ref 11.5–15.5)
IMMATURE GRANULOCYTES #: 0.06 E9/L
IMMATURE GRANULOCYTES %: 0.6 % (ref 0–5)
LYMPHOCYTES ABSOLUTE: 2.63 E9/L (ref 1.5–4)
LYMPHOCYTES RELATIVE PERCENT: 25.6 % (ref 20–42)
Lab: ABNORMAL
MCH RBC QN AUTO: 31.8 PG (ref 26–35)
MCHC RBC AUTO-ENTMCNC: 34.1 % (ref 32–34.5)
MCV RBC AUTO: 93.3 FL (ref 80–99.9)
METER GLUCOSE: 101 MG/DL (ref 74–99)
METER GLUCOSE: 122 MG/DL (ref 74–99)
METER GLUCOSE: 89 MG/DL (ref 74–99)
METHADONE SCREEN, URINE: NOT DETECTED
MONOCYTES ABSOLUTE: 0.77 E9/L (ref 0.1–0.95)
MONOCYTES RELATIVE PERCENT: 7.5 % (ref 2–12)
NEUTROPHILS ABSOLUTE: 6.67 E9/L (ref 1.8–7.3)
NEUTROPHILS RELATIVE PERCENT: 64.7 % (ref 43–80)
OPIATE SCREEN URINE: NOT DETECTED
OXYCODONE URINE: NOT DETECTED
PDW BLD-RTO: 12.7 FL (ref 11.5–15)
PHENCYCLIDINE SCREEN URINE: NOT DETECTED
PLATELET # BLD: 262 E9/L (ref 130–450)
PMV BLD AUTO: 11.1 FL (ref 7–12)
POTASSIUM SERPL-SCNC: 4.5 MMOL/L (ref 3.5–5)
RBC # BLD: 3.87 E12/L (ref 3.5–5.5)
SARS-COV-2, NAAT: NOT DETECTED
SODIUM BLD-SCNC: 137 MMOL/L (ref 132–146)
TOTAL PROTEIN: 6.4 G/DL (ref 6.4–8.3)
WBC # BLD: 10.3 E9/L (ref 4.5–11.5)

## 2021-07-19 PROCEDURE — 82962 GLUCOSE BLOOD TEST: CPT

## 2021-07-19 PROCEDURE — 86900 BLOOD TYPING SEROLOGIC ABO: CPT

## 2021-07-19 PROCEDURE — 80307 DRUG TEST PRSMV CHEM ANLYZR: CPT

## 2021-07-19 PROCEDURE — 80053 COMPREHEN METABOLIC PANEL: CPT

## 2021-07-19 PROCEDURE — 36415 COLL VENOUS BLD VENIPUNCTURE: CPT

## 2021-07-19 PROCEDURE — 6370000000 HC RX 637 (ALT 250 FOR IP): Performed by: OBSTETRICS & GYNECOLOGY

## 2021-07-19 PROCEDURE — 86850 RBC ANTIBODY SCREEN: CPT

## 2021-07-19 PROCEDURE — 86901 BLOOD TYPING SEROLOGIC RH(D): CPT

## 2021-07-19 PROCEDURE — 85025 COMPLETE CBC W/AUTO DIFF WBC: CPT

## 2021-07-19 PROCEDURE — 1220000001 HC SEMI PRIVATE L&D R&B

## 2021-07-19 PROCEDURE — G0480 DRUG TEST DEF 1-7 CLASSES: HCPCS

## 2021-07-19 PROCEDURE — 87635 SARS-COV-2 COVID-19 AMP PRB: CPT

## 2021-07-19 RX ADMIN — INSULIN HUMAN 4 UNITS: 100 INJECTION, SOLUTION PARENTERAL at 12:20

## 2021-07-19 RX ADMIN — INSULIN HUMAN 4 UNITS: 100 INJECTION, SOLUTION PARENTERAL at 08:15

## 2021-07-19 RX ADMIN — INSULIN HUMAN 4 UNITS: 100 INJECTION, SOLUTION PARENTERAL at 16:48

## 2021-07-19 RX ADMIN — INSULIN HUMAN 8 UNITS: 100 INJECTION, SUSPENSION SUBCUTANEOUS at 22:04

## 2021-07-19 NOTE — PROGRESS NOTES
Doing okay with no complaints today. Denies bleeding or fluid leak. Reports good fetal movement. Denies feeling any contractions. Blood pressure 118/62, pulse 81, temperature 98.5 °F (36.9 °C), resp. rate 18, last menstrual period 2020, unknown if currently breastfeeding.      Fetal heart tones: Reactive pattern  [place]: No contractions    Assessment  36 weeks and 6-day pregnancy  Poorly controlled gestational diabetic  Poor prenatal care  Previous     Plan  Repeat  at 37 weeks

## 2021-07-19 NOTE — PROGRESS NOTES
RN assuming care of patient. In good spirits at this time. Informed by Fredi Tovar that he will be doing  tomorrow at 1600pm-patient agreeable to plan of care. VSS. Voiding QS. BM this am with no difficulties. Accucheck done with morning insulin administered-breakfast tray given. Call bell in reach.

## 2021-07-19 NOTE — PROGRESS NOTES
Assumed care of patient for this shift. Plan of care for night discussed, patient verbalizes understanding. Patient perceives fetal movement and audible per Rn. Call light placed within reach.

## 2021-07-19 NOTE — PROGRESS NOTES
EFM removed after intermittent tracing obtained. IV restarted in left hand after previous IV removed. Blood work obtained. Rapid Covid obtained.

## 2021-07-19 NOTE — PROGRESS NOTES
Assumed care of pt for this shift. Positive fetal movement perceived by pt and audible on monitor. Pt denies any vaginal bleeding or leakage of fluid at this time. Pt resting comfortably. Plan of care discussed with pt. Pt verbalizes understanding without questions at this time.

## 2021-07-20 ENCOUNTER — ANESTHESIA EVENT (OUTPATIENT)
Dept: LABOR AND DELIVERY | Age: 31
DRG: 540 | End: 2021-07-20
Payer: COMMERCIAL

## 2021-07-20 ENCOUNTER — ANESTHESIA (OUTPATIENT)
Dept: LABOR AND DELIVERY | Age: 31
DRG: 540 | End: 2021-07-20
Payer: COMMERCIAL

## 2021-07-20 VITALS
RESPIRATION RATE: 18 BRPM | OXYGEN SATURATION: 98 % | DIASTOLIC BLOOD PRESSURE: 55 MMHG | SYSTOLIC BLOOD PRESSURE: 116 MMHG

## 2021-07-20 LAB
COMMENT: NORMAL
INTEGRITY CHECK, CREATININE, URINE: 124.6
INTEGRITY CHECK, OXIDANT, URINE: <40
INTEGRITY CHECK, PH, URINE: 6.5 (ref 4.5–9)
INTEGRITY CHECK, SPECIFIC GRAVITY, URINE: 1.02 (ref 1–1.03)
INTEGRITY CHECK, SPECIMEN INTEGRITY, URINE: NORMAL
METER GLUCOSE: 106 MG/DL (ref 74–99)
METER GLUCOSE: 110 MG/DL (ref 74–99)
METER GLUCOSE: 136 MG/DL (ref 74–99)
METER GLUCOSE: 86 MG/DL (ref 74–99)
THC NORMALIZED, QUANTITIATIVE, URINE: 33.9
THC-COOH, QUANTITATIVE, URINE: 42.3

## 2021-07-20 PROCEDURE — 6360000002 HC RX W HCPCS: Performed by: ANESTHESIOLOGY

## 2021-07-20 PROCEDURE — 6360000002 HC RX W HCPCS

## 2021-07-20 PROCEDURE — 2709999900 HC NON-CHARGEABLE SUPPLY: Performed by: OBSTETRICS & GYNECOLOGY

## 2021-07-20 PROCEDURE — 1220000001 HC SEMI PRIVATE L&D R&B

## 2021-07-20 PROCEDURE — 3609079900 HC CESAREAN SECTION: Performed by: OBSTETRICS & GYNECOLOGY

## 2021-07-20 PROCEDURE — 2500000003 HC RX 250 WO HCPCS: Performed by: NURSE ANESTHETIST, CERTIFIED REGISTERED

## 2021-07-20 PROCEDURE — 2580000003 HC RX 258: Performed by: NURSE ANESTHETIST, CERTIFIED REGISTERED

## 2021-07-20 PROCEDURE — 6360000002 HC RX W HCPCS: Performed by: OBSTETRICS & GYNECOLOGY

## 2021-07-20 PROCEDURE — 6370000000 HC RX 637 (ALT 250 FOR IP): Performed by: ANESTHESIOLOGY

## 2021-07-20 PROCEDURE — 2580000003 HC RX 258: Performed by: OBSTETRICS & GYNECOLOGY

## 2021-07-20 PROCEDURE — 6370000000 HC RX 637 (ALT 250 FOR IP): Performed by: OBSTETRICS & GYNECOLOGY

## 2021-07-20 PROCEDURE — 7100000000 HC PACU RECOVERY - FIRST 15 MIN: Performed by: OBSTETRICS & GYNECOLOGY

## 2021-07-20 PROCEDURE — 82962 GLUCOSE BLOOD TEST: CPT

## 2021-07-20 PROCEDURE — 3700000001 HC ADD 15 MINUTES (ANESTHESIA): Performed by: OBSTETRICS & GYNECOLOGY

## 2021-07-20 PROCEDURE — 51702 INSERT TEMP BLADDER CATH: CPT

## 2021-07-20 PROCEDURE — 3700000000 HC ANESTHESIA ATTENDED CARE: Performed by: OBSTETRICS & GYNECOLOGY

## 2021-07-20 PROCEDURE — 7100000001 HC PACU RECOVERY - ADDTL 15 MIN: Performed by: OBSTETRICS & GYNECOLOGY

## 2021-07-20 PROCEDURE — 6360000002 HC RX W HCPCS: Performed by: NURSE ANESTHETIST, CERTIFIED REGISTERED

## 2021-07-20 RX ORDER — SODIUM CHLORIDE, SODIUM LACTATE, POTASSIUM CHLORIDE, CALCIUM CHLORIDE 600; 310; 30; 20 MG/100ML; MG/100ML; MG/100ML; MG/100ML
INJECTION, SOLUTION INTRAVENOUS CONTINUOUS
Status: DISCONTINUED | OUTPATIENT
Start: 2021-07-20 | End: 2021-07-20

## 2021-07-20 RX ORDER — SODIUM CHLORIDE, SODIUM LACTATE, POTASSIUM CHLORIDE, CALCIUM CHLORIDE 600; 310; 30; 20 MG/100ML; MG/100ML; MG/100ML; MG/100ML
INJECTION, SOLUTION INTRAVENOUS CONTINUOUS PRN
Status: DISCONTINUED | OUTPATIENT
Start: 2021-07-20 | End: 2021-07-20 | Stop reason: SDUPTHER

## 2021-07-20 RX ORDER — DEXAMETHASONE SODIUM PHOSPHATE 10 MG/ML
INJECTION, SOLUTION INTRAMUSCULAR; INTRAVENOUS PRN
Status: DISCONTINUED | OUTPATIENT
Start: 2021-07-20 | End: 2021-07-20 | Stop reason: SDUPTHER

## 2021-07-20 RX ORDER — METHYLERGONOVINE MALEATE 0.2 MG/ML
200 INJECTION INTRAVENOUS PRN
Status: DISCONTINUED | OUTPATIENT
Start: 2021-07-20 | End: 2021-07-23 | Stop reason: HOSPADM

## 2021-07-20 RX ORDER — OXYCODONE HYDROCHLORIDE AND ACETAMINOPHEN 5; 325 MG/1; MG/1
1 TABLET ORAL EVERY 4 HOURS PRN
Status: DISCONTINUED | OUTPATIENT
Start: 2021-07-21 | End: 2021-07-23 | Stop reason: HOSPADM

## 2021-07-20 RX ORDER — BUPIVACAINE HYDROCHLORIDE 7.5 MG/ML
INJECTION, SOLUTION INTRASPINAL PRN
Status: DISCONTINUED | OUTPATIENT
Start: 2021-07-20 | End: 2021-07-20 | Stop reason: SDUPTHER

## 2021-07-20 RX ORDER — SODIUM CHLORIDE 0.9 % (FLUSH) 0.9 %
10 SYRINGE (ML) INJECTION PRN
Status: DISCONTINUED | OUTPATIENT
Start: 2021-07-20 | End: 2021-07-23 | Stop reason: HOSPADM

## 2021-07-20 RX ORDER — SODIUM CHLORIDE 9 MG/ML
25 INJECTION, SOLUTION INTRAVENOUS PRN
Status: DISCONTINUED | OUTPATIENT
Start: 2021-07-20 | End: 2021-07-23 | Stop reason: HOSPADM

## 2021-07-20 RX ORDER — OXYCODONE HYDROCHLORIDE AND ACETAMINOPHEN 5; 325 MG/1; MG/1
2 TABLET ORAL EVERY 4 HOURS PRN
Status: DISCONTINUED | OUTPATIENT
Start: 2021-07-21 | End: 2021-07-23 | Stop reason: HOSPADM

## 2021-07-20 RX ORDER — OXYCODONE HYDROCHLORIDE 5 MG/1
5 TABLET ORAL EVERY 4 HOURS PRN
Status: DISPENSED | OUTPATIENT
Start: 2021-07-20 | End: 2021-07-21

## 2021-07-20 RX ORDER — SODIUM CHLORIDE 0.9 % (FLUSH) 0.9 %
10 SYRINGE (ML) INJECTION EVERY 12 HOURS SCHEDULED
Status: DISCONTINUED | OUTPATIENT
Start: 2021-07-20 | End: 2021-07-20

## 2021-07-20 RX ORDER — SODIUM CHLORIDE 0.9 % (FLUSH) 0.9 %
10 SYRINGE (ML) INJECTION PRN
Status: DISCONTINUED | OUTPATIENT
Start: 2021-07-20 | End: 2021-07-20

## 2021-07-20 RX ORDER — TRISODIUM CITRATE DIHYDRATE AND CITRIC ACID MONOHYDRATE 500; 334 MG/5ML; MG/5ML
30 SOLUTION ORAL ONCE
Status: COMPLETED | OUTPATIENT
Start: 2021-07-20 | End: 2021-07-20

## 2021-07-20 RX ORDER — NALOXONE HYDROCHLORIDE 0.4 MG/ML
0.4 INJECTION, SOLUTION INTRAMUSCULAR; INTRAVENOUS; SUBCUTANEOUS PRN
Status: DISCONTINUED | OUTPATIENT
Start: 2021-07-20 | End: 2021-07-20

## 2021-07-20 RX ORDER — NALBUPHINE HCL 10 MG/ML
5 AMPUL (ML) INJECTION EVERY 4 HOURS PRN
Status: DISCONTINUED | OUTPATIENT
Start: 2021-07-20 | End: 2021-07-23 | Stop reason: HOSPADM

## 2021-07-20 RX ORDER — OXYCODONE HYDROCHLORIDE AND ACETAMINOPHEN 5; 325 MG/1; MG/1
2 TABLET ORAL EVERY 4 HOURS PRN
Status: DISCONTINUED | OUTPATIENT
Start: 2021-07-20 | End: 2021-07-20 | Stop reason: SDUPTHER

## 2021-07-20 RX ORDER — IBUPROFEN 800 MG/1
800 TABLET ORAL EVERY 6 HOURS PRN
Status: DISCONTINUED | OUTPATIENT
Start: 2021-07-20 | End: 2021-07-23 | Stop reason: HOSPADM

## 2021-07-20 RX ORDER — SODIUM CHLORIDE, SODIUM LACTATE, POTASSIUM CHLORIDE, CALCIUM CHLORIDE 600; 310; 30; 20 MG/100ML; MG/100ML; MG/100ML; MG/100ML
INJECTION, SOLUTION INTRAVENOUS CONTINUOUS
Status: DISCONTINUED | OUTPATIENT
Start: 2021-07-20 | End: 2021-07-23 | Stop reason: HOSPADM

## 2021-07-20 RX ORDER — DIPHENHYDRAMINE HYDROCHLORIDE 50 MG/ML
25 INJECTION INTRAMUSCULAR; INTRAVENOUS EVERY 6 HOURS PRN
Status: DISCONTINUED | OUTPATIENT
Start: 2021-07-20 | End: 2021-07-20

## 2021-07-20 RX ORDER — FERROUS SULFATE 325(65) MG
325 TABLET ORAL 2 TIMES DAILY WITH MEALS
Status: DISCONTINUED | OUTPATIENT
Start: 2021-07-21 | End: 2021-07-23 | Stop reason: HOSPADM

## 2021-07-20 RX ORDER — ACETAMINOPHEN 325 MG/1
325 TABLET ORAL EVERY 4 HOURS PRN
Status: DISCONTINUED | OUTPATIENT
Start: 2021-07-20 | End: 2021-07-20

## 2021-07-20 RX ORDER — ONDANSETRON 2 MG/ML
INJECTION INTRAMUSCULAR; INTRAVENOUS PRN
Status: DISCONTINUED | OUTPATIENT
Start: 2021-07-20 | End: 2021-07-20 | Stop reason: SDUPTHER

## 2021-07-20 RX ORDER — CEFAZOLIN SODIUM 2 G/50ML
2000 SOLUTION INTRAVENOUS ONCE
Status: COMPLETED | OUTPATIENT
Start: 2021-07-20 | End: 2021-07-20

## 2021-07-20 RX ORDER — ONDANSETRON 2 MG/ML
4 INJECTION INTRAMUSCULAR; INTRAVENOUS EVERY 6 HOURS PRN
Status: DISCONTINUED | OUTPATIENT
Start: 2021-07-20 | End: 2021-07-20

## 2021-07-20 RX ORDER — DIPHENHYDRAMINE HYDROCHLORIDE 50 MG/ML
25 INJECTION INTRAMUSCULAR; INTRAVENOUS EVERY 6 HOURS PRN
Status: DISCONTINUED | OUTPATIENT
Start: 2021-07-20 | End: 2021-07-23 | Stop reason: HOSPADM

## 2021-07-20 RX ORDER — MORPHINE SULFATE 1 MG/ML
INJECTION, SOLUTION EPIDURAL; INTRATHECAL; INTRAVENOUS PRN
Status: DISCONTINUED | OUTPATIENT
Start: 2021-07-20 | End: 2021-07-20 | Stop reason: SDUPTHER

## 2021-07-20 RX ORDER — DOCUSATE SODIUM 100 MG/1
100 CAPSULE, LIQUID FILLED ORAL 2 TIMES DAILY
Status: DISCONTINUED | OUTPATIENT
Start: 2021-07-20 | End: 2021-07-23 | Stop reason: HOSPADM

## 2021-07-20 RX ORDER — SODIUM CHLORIDE, SODIUM LACTATE, POTASSIUM CHLORIDE, AND CALCIUM CHLORIDE .6; .31; .03; .02 G/100ML; G/100ML; G/100ML; G/100ML
1000 INJECTION, SOLUTION INTRAVENOUS ONCE
Status: COMPLETED | OUTPATIENT
Start: 2021-07-20 | End: 2021-07-20

## 2021-07-20 RX ORDER — OXYCODONE HYDROCHLORIDE AND ACETAMINOPHEN 5; 325 MG/1; MG/1
1 TABLET ORAL EVERY 4 HOURS PRN
Status: DISCONTINUED | OUTPATIENT
Start: 2021-07-20 | End: 2021-07-20 | Stop reason: SDUPTHER

## 2021-07-20 RX ORDER — PENICILLIN G POTASSIUM 5000000 [IU]/1
INJECTION, POWDER, FOR SOLUTION INTRAMUSCULAR; INTRAVENOUS
Status: DISCONTINUED
Start: 2021-07-20 | End: 2021-07-20

## 2021-07-20 RX ORDER — PENICILLIN G 3000000 [IU]/50ML
3 INJECTION, SOLUTION INTRAVENOUS ONCE
Status: COMPLETED | OUTPATIENT
Start: 2021-07-20 | End: 2021-07-20

## 2021-07-20 RX ORDER — OXYCODONE HYDROCHLORIDE 5 MG/1
5 TABLET ORAL EVERY 4 HOURS PRN
Status: DISCONTINUED | OUTPATIENT
Start: 2021-07-20 | End: 2021-07-20 | Stop reason: SDUPTHER

## 2021-07-20 RX ORDER — ONDANSETRON 4 MG/1
8 TABLET, ORALLY DISINTEGRATING ORAL EVERY 8 HOURS PRN
Status: DISCONTINUED | OUTPATIENT
Start: 2021-07-20 | End: 2021-07-23 | Stop reason: HOSPADM

## 2021-07-20 RX ORDER — OXYCODONE HYDROCHLORIDE 5 MG/1
5 TABLET ORAL EVERY 4 HOURS PRN
Status: DISCONTINUED | OUTPATIENT
Start: 2021-07-20 | End: 2021-07-20

## 2021-07-20 RX ORDER — SODIUM CHLORIDE 0.9 % (FLUSH) 0.9 %
5-40 SYRINGE (ML) INJECTION 2 TIMES DAILY
Status: DISCONTINUED | OUTPATIENT
Start: 2021-07-20 | End: 2021-07-20

## 2021-07-20 RX ORDER — PHENYLEPHRINE HYDROCHLORIDE 10 MG/ML
INJECTION INTRAVENOUS PRN
Status: DISCONTINUED | OUTPATIENT
Start: 2021-07-20 | End: 2021-07-20 | Stop reason: SDUPTHER

## 2021-07-20 RX ORDER — MODIFIED LANOLIN
OINTMENT (GRAM) TOPICAL
Status: DISCONTINUED | OUTPATIENT
Start: 2021-07-20 | End: 2021-07-23 | Stop reason: HOSPADM

## 2021-07-20 RX ORDER — SODIUM CHLORIDE 0.9 % (FLUSH) 0.9 %
10 SYRINGE (ML) INJECTION EVERY 12 HOURS SCHEDULED
Status: DISCONTINUED | OUTPATIENT
Start: 2021-07-20 | End: 2021-07-23 | Stop reason: HOSPADM

## 2021-07-20 RX ORDER — SODIUM CHLORIDE 9 MG/ML
25 INJECTION, SOLUTION INTRAVENOUS PRN
Status: DISCONTINUED | OUTPATIENT
Start: 2021-07-20 | End: 2021-07-20

## 2021-07-20 RX ORDER — POVIDONE-IODINE 10 MG/ML
SOLUTION TOPICAL PRN
Status: DISCONTINUED | OUTPATIENT
Start: 2021-07-20 | End: 2021-07-20

## 2021-07-20 RX ADMIN — DEXAMETHASONE SODIUM PHOSPHATE 10 MG: 10 INJECTION, SOLUTION INTRAMUSCULAR; INTRAVENOUS at 19:03

## 2021-07-20 RX ADMIN — ONDANSETRON 4 MG: 2 INJECTION INTRAMUSCULAR; INTRAVENOUS at 19:06

## 2021-07-20 RX ADMIN — Medication 999 ML/HR: at 19:02

## 2021-07-20 RX ADMIN — Medication: at 19:30

## 2021-07-20 RX ADMIN — PHENYLEPHRINE HYDROCHLORIDE 100 MCG: 10 INJECTION INTRAVENOUS at 18:45

## 2021-07-20 RX ADMIN — PHENYLEPHRINE HYDROCHLORIDE 100 MCG: 10 INJECTION INTRAVENOUS at 18:49

## 2021-07-20 RX ADMIN — INSULIN HUMAN 4 UNITS: 100 INJECTION, SOLUTION PARENTERAL at 08:04

## 2021-07-20 RX ADMIN — SODIUM CHLORIDE, POTASSIUM CHLORIDE, SODIUM LACTATE AND CALCIUM CHLORIDE: 600; 310; 30; 20 INJECTION, SOLUTION INTRAVENOUS at 18:38

## 2021-07-20 RX ADMIN — SODIUM CHLORIDE, POTASSIUM CHLORIDE, SODIUM LACTATE AND CALCIUM CHLORIDE: 600; 310; 30; 20 INJECTION, SOLUTION INTRAVENOUS at 19:01

## 2021-07-20 RX ADMIN — SODIUM CHLORIDE, POTASSIUM CHLORIDE, SODIUM LACTATE AND CALCIUM CHLORIDE: 600; 310; 30; 20 INJECTION, SOLUTION INTRAVENOUS at 19:30

## 2021-07-20 RX ADMIN — IBUPROFEN 800 MG: 800 TABLET, FILM COATED ORAL at 22:12

## 2021-07-20 RX ADMIN — PENICILLIN G 3 MILLION UNITS: 3000000 INJECTION, SOLUTION INTRAVENOUS at 14:10

## 2021-07-20 RX ADMIN — NALBUPHINE HYDROCHLORIDE 5 MG: 10 INJECTION, SOLUTION INTRAMUSCULAR; INTRAVENOUS; SUBCUTANEOUS at 23:47

## 2021-07-20 RX ADMIN — OXYCODONE HYDROCHLORIDE 5 MG: 5 TABLET ORAL at 23:44

## 2021-07-20 RX ADMIN — BUPIVACAINE HYDROCHLORIDE IN DEXTROSE 1.6 ML: 7.5 INJECTION, SOLUTION SUBARACHNOID at 18:45

## 2021-07-20 RX ADMIN — CEFAZOLIN SODIUM 2000 MG: 2 SOLUTION INTRAVENOUS at 18:25

## 2021-07-20 RX ADMIN — DEXTROSE MONOHYDRATE 5 MILLION UNITS: 5 INJECTION INTRAVENOUS at 10:15

## 2021-07-20 RX ADMIN — SODIUM CHLORIDE, POTASSIUM CHLORIDE, SODIUM LACTATE AND CALCIUM CHLORIDE 1000 ML: 600; 310; 30; 20 INJECTION, SOLUTION INTRAVENOUS at 16:00

## 2021-07-20 RX ADMIN — SODIUM CHLORIDE, POTASSIUM CHLORIDE, SODIUM LACTATE AND CALCIUM CHLORIDE: 600; 310; 30; 20 INJECTION, SOLUTION INTRAVENOUS at 10:10

## 2021-07-20 RX ADMIN — SODIUM CITRATE AND CITRIC ACID MONOHYDRATE 30 ML: 500; 334 SOLUTION ORAL at 18:25

## 2021-07-20 RX ADMIN — MORPHINE SULFATE 0.15 MG: 1 INJECTION, SOLUTION EPIDURAL; INTRATHECAL; INTRAVENOUS at 18:45

## 2021-07-20 RX ADMIN — DIPHENHYDRAMINE HYDROCHLORIDE 25 MG: 50 INJECTION INTRAMUSCULAR; INTRAVENOUS at 22:12

## 2021-07-20 ASSESSMENT — PAIN DESCRIPTION - ONSET: ONSET: AWAKENED FROM SLEEP

## 2021-07-20 ASSESSMENT — PULMONARY FUNCTION TESTS
PIF_VALUE: 0
PIF_VALUE: 2
PIF_VALUE: 0

## 2021-07-20 ASSESSMENT — PAIN DESCRIPTION - ORIENTATION
ORIENTATION: LOWER;MID
ORIENTATION: LOWER;MID

## 2021-07-20 ASSESSMENT — PAIN SCALES - GENERAL
PAINLEVEL_OUTOF10: 6
PAINLEVEL_OUTOF10: 7
PAINLEVEL_OUTOF10: 7

## 2021-07-20 ASSESSMENT — PAIN DESCRIPTION - PROGRESSION
CLINICAL_PROGRESSION: NOT CHANGED
CLINICAL_PROGRESSION: NOT CHANGED

## 2021-07-20 ASSESSMENT — LIFESTYLE VARIABLES: SMOKING_STATUS: 1

## 2021-07-20 ASSESSMENT — PAIN - FUNCTIONAL ASSESSMENT: PAIN_FUNCTIONAL_ASSESSMENT: ACTIVITIES ARE NOT PREVENTED

## 2021-07-20 ASSESSMENT — PAIN DESCRIPTION - FREQUENCY
FREQUENCY: CONTINUOUS
FREQUENCY: CONTINUOUS

## 2021-07-20 ASSESSMENT — PAIN DESCRIPTION - LOCATION
LOCATION: ABDOMEN
LOCATION: ABDOMEN

## 2021-07-20 ASSESSMENT — PAIN DESCRIPTION - PAIN TYPE
TYPE: ACUTE PAIN
TYPE: ACUTE PAIN

## 2021-07-20 ASSESSMENT — PAIN DESCRIPTION - DESCRIPTORS
DESCRIPTORS: ACHING;CRAMPING
DESCRIPTORS: ACHING;CRAMPING

## 2021-07-20 NOTE — PROGRESS NOTES
Assumed care of patient. Plan of care for shift reviewed with patient, verbalized understanding and all questions answered. Patient resting in bed, call light within reach.

## 2021-07-20 NOTE — ANESTHESIA PRE PROCEDURE
Department of Anesthesiology  Preprocedure Note       Name:  Genoveva Gonzalez   Age:  26 y.o.  :  1990                                          MRN:  [id number]         Date:  2021      Surgeon: Ishan Banks):  Kana Bernardo MD    Procedure: Procedure(s):  REPEAT  SECTION    Medications prior to admission:   Prior to Admission medications    Medication Sig Start Date End Date Taking? Authorizing Provider   metoprolol succinate (TOPROL XL) 25 MG extended release tablet Take 25 mg by mouth daily   Yes Historical Provider, MD   metFORMIN (GLUCOPHAGE) 500 MG tablet Take 500 mg by mouth 2 times daily (with meals) Taking 500 mg lunch and dinner   Yes Historical Provider, MD   Prenatal Vit-Fe Fumarate-FA (PRENATAL VITAMINS PO) Take 1 tablet by mouth daily    Historical Provider, MD   ACYCLOVIR PO Take 500 mg by mouth 2 times daily as needed   Patient not taking: Reported on 2021    Historical Provider, MD       Current medications:    Current Facility-Administered Medications   Medication Dose Route Frequency Provider Last Rate Last Admin    sodium chloride flush 0.9 % injection 5-40 mL  5-40 mL Intravenous BID Paz Villar MD        lactated ringers infusion   Intravenous Continuous Paz Villar MD   Stopped at 07/15/21 0819    insulin NPH (HUMULIN N;NOVOLIN N) injection vial 8 Units  8 Units Subcutaneous Nightly Paz Villar MD   8 Units at 21 2204    insulin regular (HUMULIN R;NOVOLIN R) injection 4 Units  4 Units Subcutaneous TID AC Paz Villar MD   4 Units at 21 0804    acetaminophen (TYLENOL) tablet 650 mg  650 mg Oral Q4H PRN Paz Villar MD   650 mg at 21 2359       Allergies:     Allergies   Allergen Reactions    Bee Venom     Raspberry        Problem List:    Patient Active Problem List   Diagnosis Code    Type 2 diabetes mellitus complicating pregnancy, antepartum, second trimester O24.112    Essential hypertension I10    Habitual aborter, 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 2XX0    High risk pregnancy due to smoking in second trimester O99.332    ASD (atrial septal defect) Q21.1    Bipolar disease during pregnancy in second trimester ([email or web address] L79.550, W04.4    Complicated pregnancy, third trimester O26.93    35 weeks gestation of pregnancy H2K.98    Complication of pregnancy, third trimester O26.93       Past Medical History:        Diagnosis Date    Anxiety     Bipolar 1 disorder ([email or web address]     Depression     Diabetes mellitus ([email or web address]     FH: multiple miscarriages or stillbirths     Hypertension     Septal defect, atrial     Shingles     Thyroid disease        Past Surgical History:        Procedure Laterality Date    ABDOMEN SURGERY       SECTION  2017    DILATION AND CURETTAGE OF UTERUS      ENDOSCOPY, COLON, DIAGNOSTIC         Social History:    Social History     Tobacco Use    Smoking status: Current Every Day Smoker     Packs/day: 0.50     Years: 6.00     Pack years: 3.00     Types: Cigarettes    Smokeless tobacco: Never Used   Substance Use Topics    Alcohol use: Not Currently     Comment: occasionally                                Ready to quit: Not Answered  Counseling given: Not Answered      Vital Signs (Current):   Vitals:    21 1500 21 2345 21 0708 21 0718   BP: 126/78 121/65 124/65    Pulse: 84 81 86    Resp: 16 18 18    Temp: 98.2 °F (36.8 °C) 98.3 °F (36.8 °C) 98 °F (36.7 °C)    TempSrc:       SpO2: 99%   96%   Weight:       Height:                                                  BP Readings from Last 3 Encounters:   21 124/65   21 111/75   21 (!) 116/57 NPO Status: Time of last liquid consumption: 0820                        Time of last solid consumption: 0820                        Date of last liquid consumption: 07/19/21                        Date of last solid food consumption: 07/19/21    BMI:   Wt Readings from Last 3 Encounters:   07/19/21 240 lb (108.9 kg)   07/12/21 240 lb 4 oz (109 kg)   06/24/21 240 lb (108.9 kg)     Body mass index is 36.49 kg/m². CBC:   Lab Results   Component Value Date    WBC 10.3 07/19/2021    RBC 3.87 07/19/2021    HGB 12.3 07/19/2021    HCT 36.1 07/19/2021    MCV 93.3 07/19/2021    RDW 12.7 07/19/2021     07/19/2021       CMP:   Lab Results   Component Value Date     07/19/2021    K 4.5 07/19/2021    K 4.5 02/21/2020     07/19/2021    CO2 20 07/19/2021    BUN 10 07/19/2021    CREATININE 0.5 07/19/2021    GFRAA >60 07/19/2021    LABGLOM >60 07/19/2021    GLUCOSE 119 07/19/2021    GLUCOSE 302 02/13/2012    PROT 6.4 07/19/2021    CALCIUM 9.4 07/19/2021    BILITOT <0.2 07/19/2021    ALKPHOS 197 07/19/2021    AST 17 07/19/2021    ALT 5 07/19/2021       POC Tests: No results for input(s): POCGLU, POCNA, POCK, POCCL, POCBUN, POCHEMO, POCHCT in the last 72 hours.     Coags: No results found for: PROTIME, INR, APTT    HCG (If Applicable):   Lab Results   Component Value Date    PREGTESTUR positive 04/26/2017        ABGs: No results found for: PHART, PO2ART, DNX2KPC, HMX0BJT, BEART, A3TYKPGZ     Type & Screen (If Applicable):  No results found for: LABABO, LABRH    Drug/Infectious Status (If Applicable):  No results found for: HIV, HEPCAB    COVID-19 Screening (If Applicable):   Lab Results   Component Value Date    COVID19 Not Detected 07/19/2021           Anesthesia Evaluation  Patient summary reviewed no history of anesthetic complications:   Airway: Mallampati: II  TM distance: >3 FB   Neck ROM: full  Mouth opening: > = 3 FB Dental:          Pulmonary: breath sounds clear to auscultation  (+) current smoker (Has not smoked for 1 week)          Patient did not smoke on day of surgery. Cardiovascular:    (+) hypertension:,         Rhythm: regular  Rate: normal                 ROS comment: Atrial Septal Defect     Neuro/Psych:   (+) psychiatric history (Depression):            GI/Hepatic/Renal:             Endo/Other:    (+) DiabetesType II DM, , hypothyroidism::., .                  ROS comment: Repeat  Abdominal:   (+) obese,           Vascular: negative vascular ROS. Other Findings:           Anesthesia Plan      spinal     ASA 3             Anesthetic plan and risks discussed with patient. Use of blood products discussed with patient whom consented to blood products. Plan discussed with CRNA.                   Rut Bean MD   2021

## 2021-07-20 NOTE — PROGRESS NOTES
EFM applied after breakfast. Audible fetal activity noted. Patient comfortable and resting.  NPO now-patient verbalized understanding

## 2021-07-20 NOTE — PROGRESS NOTES
RN assuming care of patient. Asleep but arousable. Denies c/o discomfort and slept well. VSS. Awaiting surgery later for repeat . Awaiting breakfast tray and then will be NPO for surgery. EFM on for intermittent tracing. No contractions observed. Reactive FHR tracing in last 10 min. observed. IV heplock in place-IV flushed with normal saline with no difficulties. Plan of care discussed with patient regarding preparation for surgery-patient verbalized understanding.

## 2021-07-20 NOTE — CARE COORDINATION
2021: SS Note:  SS Consult noted regarding \"positive drug screen for marijuana\" per chart review pt had a + UDS for cannabinoids, repeat  today, per chart review pt has hx of \"Bipolar 1 disorder, anxiety, depression\" and \" multiple past miscarriages/stillbirths\" sw will follow to provide any necessary counseling and maternity resources and for UDS results on  to complete CINDY-Mandated  guide for plan of safe care assessment and to make report to [address] [place] [address]. Electronically signed by KELVIN Lees on 2021 at 10:02 AM

## 2021-07-20 NOTE — PROGRESS NOTES
EFM removed. .Reactive FHR tracing observed with occas/mild contractions observed. No c/o by patient.

## 2021-07-20 NOTE — PROGRESS NOTES
notified of unknown GBS status with orders to administer 2 doses of Penicillin over next 4 hours pre-op. Orders to continue  to  administer Ancef 2 GM IVPB pre-op for  as ordered. No GBS culture ordered.

## 2021-07-20 NOTE — PROGRESS NOTES
Assumed care of pt at this time, report given from previous RN in the OR during C/S procedure. Pt here for Rpt C/S. Count one completed by nurse circulator giving report, to take over for the rest of the procedure.

## 2021-07-20 NOTE — PROGRESS NOTES
Nasal swabs given for Betadine prophylaxis pre-op for surgery.  Dose #2 of Penicilllin started IVPB as ordered

## 2021-07-20 NOTE — OP NOTE
PATIENT:    Juli Pryor    PREOPERATIVE DIAGNOSES:  6. [id number]        2. Previous  with uncontrolled diabetes, poor prenatal care     POSTOPERATIVE DIAGNOSES:  Same      PROCEDURE:     Repeat low transverse  section.      SURGEON:     Paz Villar MD      ESTIMATED BLOOD LOSS:   595 mL.      COMPLICATIONS:     None.         ANESTHESIA:    Spinal.         FINDINGS:   Live male         Infant Apgars 9 and 9 at 1 and 5 min respectively. Weight: 8 lbs 9 oz.        Normal tubes and ovaries bilaterally.         DETAILS OF PROCEDURE:   With the patient in the sitting position, spinal anesthesia was given without any complications. She was then placed in the supine position slightly tilted to the left. Abdomen was scrubbed and draped in the usual manner. Anesthesia level was checked and was found to be adequate. The abdomen was entered thru a transverse incision The Bovie was used to go through the subcutaneous tissue. The fascia was entered in the same plane as the skin incision and  from the underlying rectus abdominis muscles which were  in the midline exposing the parietal peritoneum. The peritoneum was opened sharply in a longitudinal fashion. A bladder retractor was placed in position and the visceral peritoneum overlying the lower uterine segment was opened transversely and a bladder flap was developed in a sharp manner. A Mobius OB retractor was placed in position. The lower uterine segment was opened in a low transverse fashion. The amniotic cavity was entered and Clear amniotic fluid was suctioned out. The baby was then delivered from the cephalic position without any complications. The baby was handed over to the nursery nurse. Cord blood was saved. The placenta was then removed manually and the endometrial cavity was swept clean by a wet lap.  The edges of the uterine incision were grasped by Allis clamps and the incision itself was closed using a continuous locked suture of 0 monocryl. Tubes and ovaries were inspected and appeared to be normal. The gutters were cleared of any blood clots and debris. The operative field appeared to be hemostatic. The Mobius retractor was removed. Correct needle, sponge and instrument count was reported and the abdomen was then closed in layers using #1 Stratafix for the fascia and the subcuticular stapler for the skin. Steristrips were applied followed by a sterile dressing and the patient was then transferred to the recovery room in good stable condition.     Raphael Ocasio MD

## 2021-07-20 NOTE — ANESTHESIA PROCEDURE NOTES
Spinal Block    Patient location during procedure: OB  Reason for block: primary anesthetic and at surgeon's request  Staffing  Performed: anesthesiologist   Anesthesiologist: Rut Bean MD  Preanesthetic Checklist  Completed: patient identified, IV checked, site marked, risks and benefits discussed, surgical consent, monitors and equipment checked, pre-op evaluation, timeout performed, anesthesia consent given, oxygen available and patient being monitored  Spinal Block  Patient position: sitting  Prep: Betadine  Patient monitoring: cardiac monitor, continuous pulse ox, continuous capnometry and frequent blood pressure checks  Approach: midline  Location: L3/L4  Provider prep: mask, sterile gloves and sterile gown  Local infiltration: lidocaine  Agent: bupivacaine  Adjuvant: duramorph (150 mcg)  Dose: 1.6  Dose: 1.6  Needle  Needle type: Quincke   Needle gauge: 25 G  Needle length: 3.5 in  Assessment  Sensory level: T6  Swirl obtained: Yes  CSF: clear  Attempts: 1  Hemodynamics: stable

## 2021-07-21 LAB
HCT VFR BLD CALC: 38 % (ref 34–48)
HEMOGLOBIN: 12.7 G/DL (ref 11.5–15.5)
METER GLUCOSE: 102 MG/DL (ref 74–99)
METER GLUCOSE: 115 MG/DL (ref 74–99)
METER GLUCOSE: 133 MG/DL (ref 74–99)
METER GLUCOSE: 144 MG/DL (ref 74–99)
METER GLUCOSE: 151 MG/DL (ref 74–99)
METER GLUCOSE: 164 MG/DL (ref 74–99)

## 2021-07-21 PROCEDURE — 36415 COLL VENOUS BLD VENIPUNCTURE: CPT

## 2021-07-21 PROCEDURE — 6360000002 HC RX W HCPCS: Performed by: OBSTETRICS & GYNECOLOGY

## 2021-07-21 PROCEDURE — 85014 HEMATOCRIT: CPT

## 2021-07-21 PROCEDURE — 6360000002 HC RX W HCPCS: Performed by: ANESTHESIOLOGY

## 2021-07-21 PROCEDURE — 85018 HEMOGLOBIN: CPT

## 2021-07-21 PROCEDURE — 6370000000 HC RX 637 (ALT 250 FOR IP): Performed by: OBSTETRICS & GYNECOLOGY

## 2021-07-21 PROCEDURE — 82962 GLUCOSE BLOOD TEST: CPT

## 2021-07-21 PROCEDURE — 1220000001 HC SEMI PRIVATE L&D R&B

## 2021-07-21 RX ADMIN — NALBUPHINE HYDROCHLORIDE 5 MG: 10 INJECTION, SOLUTION INTRAMUSCULAR; INTRAVENOUS; SUBCUTANEOUS at 06:34

## 2021-07-21 RX ADMIN — NALBUPHINE HYDROCHLORIDE 5 MG: 10 INJECTION, SOLUTION INTRAMUSCULAR; INTRAVENOUS; SUBCUTANEOUS at 11:11

## 2021-07-21 RX ADMIN — IBUPROFEN 800 MG: 800 TABLET, FILM COATED ORAL at 17:25

## 2021-07-21 RX ADMIN — NALBUPHINE HYDROCHLORIDE 5 MG: 10 INJECTION, SOLUTION INTRAMUSCULAR; INTRAVENOUS; SUBCUTANEOUS at 17:42

## 2021-07-21 RX ADMIN — METFORMIN HYDROCHLORIDE 500 MG: 500 TABLET ORAL at 08:27

## 2021-07-21 RX ADMIN — DOCUSATE SODIUM 100 MG: 100 CAPSULE ORAL at 08:27

## 2021-07-21 RX ADMIN — IBUPROFEN 800 MG: 800 TABLET, FILM COATED ORAL at 04:49

## 2021-07-21 RX ADMIN — DIPHENHYDRAMINE HYDROCHLORIDE 25 MG: 50 INJECTION INTRAMUSCULAR; INTRAVENOUS at 11:11

## 2021-07-21 RX ADMIN — IBUPROFEN 800 MG: 800 TABLET, FILM COATED ORAL at 11:10

## 2021-07-21 RX ADMIN — METFORMIN HYDROCHLORIDE 500 MG: 500 TABLET ORAL at 17:25

## 2021-07-21 RX ADMIN — ENOXAPARIN SODIUM 60 MG: 60 INJECTION SUBCUTANEOUS at 08:27

## 2021-07-21 RX ADMIN — Medication: at 08:27

## 2021-07-21 RX ADMIN — DIPHENHYDRAMINE HYDROCHLORIDE 25 MG: 50 INJECTION INTRAMUSCULAR; INTRAVENOUS at 04:49

## 2021-07-21 ASSESSMENT — PAIN SCALES - GENERAL
PAINLEVEL_OUTOF10: 3
PAINLEVEL_OUTOF10: 6
PAINLEVEL_OUTOF10: 6
PAINLEVEL_OUTOF10: 3
PAINLEVEL_OUTOF10: 5

## 2021-07-21 NOTE — PROGRESS NOTES
Recovery period over at this time, pt taken to room 1. Room orientation provided, comfort needs met.

## 2021-07-21 NOTE — CARE COORDINATION
2021: SS Note:  Met with pt, Galina, explained sw role and consult regarding her having a positive UDS on delivery, baby's UDS was negative for any illicit drugs. Pt was pleasant and open to speaking with sw regarding consult and her mental health history however became somewhat anxious and guarded at times regarding CINDY assessment questions, reassurance and support offered, pt reports to also having an allergic reaction to medication given to her and was having some difficulty staying still and focused due to constant itching, her nurse James Kessler was aware and will see pt to discuss need for additional treatment. James Kessler states that pt has however been attentive toward her  son, Adrien Hassan who remained asleep in his basinet during sw visit. Pt admits to marijuana use during her pregnancy due to being off her psychiatric medications during her pregnancy but plans to follow with her psychiatrist at [facility] and to go back on medications as prescribed and to abstain from any further illicit drug use. reviewed her mental health history, pt admits to being diagnosed with Bipolar and anxiety disorder and to having \" 8 miscarriages\" but denies any history of depression or post partum depression(PPD) or any past stillbirths and denied need for current counseling intervention or drug treatment and declined need for PRS referral and for PPD counseling/support resources. Pt reports having all necessary supports and provisions to safely take her baby home and is already receiving WIC.  She list father of baby, Mita Hylton who she resides with as her support person for plan of care and states he does not use drugs and that his mother and sisters are also supportive and help her if needed, she reports that her 15year old dtr, Mary Steiner also resides with them but that her 3year old dtr, Cale Live was removed from her care \" a few years back\" by [address] due to her having a \"positive hair test for Cocaine\" at the time and says she was placed in [address] [place] [address] drug treatment [facility], her dtr has remained in the care of her grandmother, Marc Ledesma. CINDY assessment completed and report called to Cheryle Segura, Intake screener for [employer], awaiting for agency supervisor to review and a determination if referral will be \"screened out\" or if agency will be opening a case for ongoing services. Nursing informed.  Electronically signed by KELVIN Landrum on 7/21/2021 at 1:11 PM

## 2021-07-21 NOTE — PROGRESS NOTES
Dr. Hannah Kemp at patient bedside, assessing patient and discussing plan of care with patient, who verbalized understanding and agreement.

## 2021-07-21 NOTE — PROGRESS NOTES
Comprehensive Nutrition Assessment    Type and Reason for Visit:  Initial    Nutrition Recommendations/Plan: Continue Current Diet, Start Oral Nutrition Supplement Ever BID    Nutrition Assessment:  S/p  . H/o multiple stillbirths & misscarriages, T2DM & HTN. + drug screen for marijuana upon admit. Spoke w/ nursing who stated pt avg po intake is % meals. Will start ONS to aide in wound healing. Malnutrition Assessment:  Malnutrition Status:  Insufficient data    Context:  Acute Illness     Findings of the 6 clinical characteristics of malnutrition:  Energy Intake:  Unable to assess (UTO d/t no wt hx in EMR)  Weight Loss:  No significant weight loss     Body Fat Loss:  No significant body fat loss     Muscle Mass Loss:  No significant muscle mass loss    Fluid Accumulation:  No significant fluid accumulation     Strength:  Not Performed    Estimated Daily Nutrient Needs:  Energy (kcal):  3871-3728; Weight Used for Energy Requirements:  Current     Protein (g):  115-125 (1.8-2.0g/kg IBW); Weight Used for Protein Requirements:  Ideal        Fluid (ml/day):  4697-1145; Method Used for Fluid Requirements:  1 ml/kcal      Nutrition Related Findings:  A&Ox4, +BS, trace edema, -I&Os (-1.1L). Wounds:  Surgical Incision (S/p )       Current Nutrition Therapies:    ADULT DIET;  Regular    Anthropometric Measures:  · Height: 5' 8\" (172.7 cm)  · Current Body Weight: 240 lb (108.9 kg) (, stated)   · Admission Body Weight: 240 lb (108.9 kg) ( stated)    · Usual Body Weight:  (No wt hx in EMR)     · Ideal Body Weight: 140 lbs; % Ideal Body Weight 171.4 %   · BMI: 36.5  · BMI Categories: Obese Class 2 (BMI 35.0 -39.9)       Nutrition Diagnosis:   · Increased nutrient needs related to increase demand for energy/nutrients as evidenced by wounds    Nutrition Interventions:   Food and/or Nutrient Delivery:  Continue Current Diet, Start Oral Nutrition Supplement (Ever BID)  Nutrition Education/Counseling:  Education not indicated   Coordination of Nutrition Care:  Continue to monitor while inpatient    Goals:  Pt to consume >75% meals/ONS       Nutrition Monitoring and Evaluation:   Behavioral-Environmental Outcomes:  None Identified   Food/Nutrient Intake Outcomes:  Food and Nutrient Intake, Supplement Intake  Physical Signs/Symptoms Outcomes:  Biochemical Data, Fluid Status or Edema, Nutrition Focused Physical Findings, Skin, Weight     Discharge Planning:     Too soon to determine     Electronically signed by Fanny Schlatter, RD, LD on 7/21/21 at 10:34 AM EDT    Contact: [phone]

## 2021-07-21 NOTE — PLAN OF CARE
Problem: Pain - Acute:  Goal: Pain level will decrease  Description: Pain level will decrease  Outcome: Met This Shift     Problem: Mood - Altered:  Goal: Mood stable  Description: Mood stable  Outcome: Met This Shift   Patient reported she is satisfied with pain control and generalized during this shift. Patient is ambulating in the room, tolerating well. Patient showered, and has voided. Patient is bonding well with , and attentive to 's needs.  has been placed back to sleep on his back in the bassinet during this shift.

## 2021-07-21 NOTE — PROGRESS NOTES
Assumed care of patient with bedside report. Plan of care discussed with patient, who verbalized understanding with no questions. White board updated. Call light within reach. Patient verbalized understanding to use her call light and have nurse present when she is ready to get up out of bed.

## 2021-07-21 NOTE — PROGRESS NOTES
Guillermo cath removed at this time, pt now DTV. Pt instructed to call out for assistance when she feels the urge to void.

## 2021-07-22 LAB
METER GLUCOSE: 128 MG/DL (ref 74–99)
METER GLUCOSE: 153 MG/DL (ref 74–99)
METER GLUCOSE: 163 MG/DL (ref 74–99)

## 2021-07-22 PROCEDURE — 82962 GLUCOSE BLOOD TEST: CPT

## 2021-07-22 PROCEDURE — 6360000002 HC RX W HCPCS: Performed by: OBSTETRICS & GYNECOLOGY

## 2021-07-22 PROCEDURE — 6370000000 HC RX 637 (ALT 250 FOR IP): Performed by: OBSTETRICS & GYNECOLOGY

## 2021-07-22 PROCEDURE — 1220000001 HC SEMI PRIVATE L&D R&B

## 2021-07-22 RX ADMIN — DOCUSATE SODIUM 100 MG: 100 CAPSULE ORAL at 20:16

## 2021-07-22 RX ADMIN — IBUPROFEN 800 MG: 800 TABLET, FILM COATED ORAL at 15:07

## 2021-07-22 RX ADMIN — DOCUSATE SODIUM 100 MG: 100 CAPSULE ORAL at 08:20

## 2021-07-22 RX ADMIN — METFORMIN HYDROCHLORIDE 500 MG: 500 TABLET ORAL at 17:03

## 2021-07-22 RX ADMIN — OXYCODONE AND ACETAMINOPHEN 1 TABLET: 5; 325 TABLET ORAL at 20:16

## 2021-07-22 RX ADMIN — ENOXAPARIN SODIUM 60 MG: 60 INJECTION SUBCUTANEOUS at 08:28

## 2021-07-22 RX ADMIN — IBUPROFEN 800 MG: 800 TABLET, FILM COATED ORAL at 08:21

## 2021-07-22 RX ADMIN — OXYCODONE AND ACETAMINOPHEN 1 TABLET: 5; 325 TABLET ORAL at 05:41

## 2021-07-22 RX ADMIN — IBUPROFEN 800 MG: 800 TABLET, FILM COATED ORAL at 23:02

## 2021-07-22 RX ADMIN — DOCUSATE SODIUM 100 MG: 100 CAPSULE ORAL at 00:00

## 2021-07-22 RX ADMIN — IBUPROFEN 800 MG: 800 TABLET, FILM COATED ORAL at 00:00

## 2021-07-22 RX ADMIN — METFORMIN HYDROCHLORIDE 500 MG: 500 TABLET ORAL at 08:21

## 2021-07-22 RX ADMIN — OXYCODONE AND ACETAMINOPHEN 2 TABLET: 5; 325 TABLET ORAL at 10:19

## 2021-07-22 ASSESSMENT — PAIN DESCRIPTION - ONSET
ONSET: ON-GOING

## 2021-07-22 ASSESSMENT — PAIN DESCRIPTION - LOCATION
LOCATION: ABDOMEN;INCISION

## 2021-07-22 ASSESSMENT — PAIN DESCRIPTION - PAIN TYPE
TYPE: ACUTE PAIN;SURGICAL PAIN

## 2021-07-22 ASSESSMENT — PAIN DESCRIPTION - FREQUENCY
FREQUENCY: CONTINUOUS

## 2021-07-22 ASSESSMENT — PAIN - FUNCTIONAL ASSESSMENT
PAIN_FUNCTIONAL_ASSESSMENT: ACTIVITIES ARE NOT PREVENTED
PAIN_FUNCTIONAL_ASSESSMENT: 0-10
PAIN_FUNCTIONAL_ASSESSMENT: ACTIVITIES ARE NOT PREVENTED
PAIN_FUNCTIONAL_ASSESSMENT: 0-10
PAIN_FUNCTIONAL_ASSESSMENT: ACTIVITIES ARE NOT PREVENTED
PAIN_FUNCTIONAL_ASSESSMENT: 0-10
PAIN_FUNCTIONAL_ASSESSMENT: ACTIVITIES ARE NOT PREVENTED

## 2021-07-22 ASSESSMENT — PAIN SCALES - GENERAL
PAINLEVEL_OUTOF10: 7
PAINLEVEL_OUTOF10: 3
PAINLEVEL_OUTOF10: 3
PAINLEVEL_OUTOF10: 0
PAINLEVEL_OUTOF10: 6
PAINLEVEL_OUTOF10: 9
PAINLEVEL_OUTOF10: 2
PAINLEVEL_OUTOF10: 8
PAINLEVEL_OUTOF10: 9

## 2021-07-22 ASSESSMENT — PAIN DESCRIPTION - DESCRIPTORS
DESCRIPTORS: PINS AND NEEDLES
DESCRIPTORS: CRAMPING;ACHING;DISCOMFORT;SORE
DESCRIPTORS: ACHING;CRAMPING;DISCOMFORT;SORE
DESCRIPTORS: PINS AND NEEDLES

## 2021-07-22 ASSESSMENT — PAIN DESCRIPTION - ORIENTATION
ORIENTATION: LOWER

## 2021-07-22 ASSESSMENT — PAIN DESCRIPTION - PROGRESSION
CLINICAL_PROGRESSION: GRADUALLY WORSENING
CLINICAL_PROGRESSION: NOT CHANGED
CLINICAL_PROGRESSION: GRADUALLY WORSENING
CLINICAL_PROGRESSION: GRADUALLY IMPROVING

## 2021-07-22 NOTE — ANESTHESIA POSTPROCEDURE EVALUATION
Department of Anesthesiology  Postprocedure Note    Patient: Juan Sharif  MRN: [id number]  YOB: 1990  Date of evaluation: 2021  Time:  6:14 AM     Procedure Summary     Date: 21 Room / Location: [facility] L&D OR  [address] [facility]    Anesthesia Start: 4948 Anesthesia Stop:     Procedure: REPEAT  SECTION (N/A Abdomen) Diagnosis: (FULL TERM)    Surgeons: Bia Yepez MD Responsible Provider: Stefano Leung MD    Anesthesia Type: spinal ASA Status: 3          Anesthesia Type: spinal    Jose Phase I: Jose Score: 9    Jose Phase II:      Last vitals: Reviewed and per EMR flowsheets.        Anesthesia Post Evaluation    Patient location during evaluation: PACU  Patient participation: complete - patient participated  Level of consciousness: awake  Airway patency: patent  Nausea & Vomiting: no nausea and no vomiting  Complications: no  Cardiovascular status: hemodynamically stable  Respiratory status: acceptable  Hydration status: stable

## 2021-07-22 NOTE — PLAN OF CARE
Lactation Consultant phone number given to patient for any post-discharge questions or concerns and Labor & Delivery phone number given if Lactation Consultant not available.   Batool GomesBSN, RN, IBCLC, CCCE

## 2021-07-22 NOTE — PROGRESS NOTES
[facility] for pt to rest. Medicated per request. Rest encouraged.

## 2021-07-22 NOTE — PROGRESS NOTES
Assumed care of patient. Plan of care for shift reviewed with patient, verbalized understanding and all questions answered. Patient resting in bed, call light within reach.  safe sleep also reviewed with patient, verbalized understanding and all questions answered.

## 2021-07-22 NOTE — CARE COORDINATION
2021: SS Note:  Notified by Rocky Zamorano liaison for [id number] that referral was \"screened out\" and agency is NOT assigning a case for ongoing services at this time, NO HOLD on  who may be released home with pt when medically discharged, pt and nursing informed.  Electronically signed by KELVIN Thurman on 2021 at 1:04 PM

## 2021-07-22 NOTE — PROGRESS NOTES
in at bedside to inform patient she is cleared for discharge-patient very happy and relieved. Bonding well with baby and demonstrates affection. In good spirits. Denies abdominal pain and in good spirits. VSS. Call bell in reach.

## 2021-07-22 NOTE — CARE COORDINATION
2021: SS Note:  sw contacted by [address], Graciela Garvey who informed sw that they received an \"anonymous report\" that pt may be being evicted from her home and may not have necessary provisions to care for  at discharge, agency is now going to open a case and the assigned cw, Nicolas Reji will be either calling or coming out to the hospital tomorrow morning to speak with pt to discuss the report, she may also need to make a home visit prior to her and 's release to ensure they have appropriate housing and provisions, pt's nurse Micheline Alvarez notified and informed that pt and  are NOT being discharged today, pt did report to nursing that fob is with the other children and that her cousin, Genoveva Tobar who was with her during her delivery would be bringing the car seat to the hospital and transporting her home tomorrow, sw to follow in am with [facility] cw.  Electronically signed by KELVIN Jane on 2021 at 3:07 PM

## 2021-07-22 NOTE — PROGRESS NOTES
Assumed care of pt for 11-7 shift. First contact with pt. Plan of care for night discussed. Pt verbalizes understanding. Medicated per pt request for abdominal pain. Safe sleep practices reviewed and discussed. Mother verbalizes understanding of need for baby to sleep in crib. Rest encouraged.

## 2021-07-22 NOTE — PLAN OF CARE
Problem: Fluid Volume - Imbalance:  Goal: Absence of postpartum hemorrhage signs and symptoms  Description: Absence of postpartum hemorrhage signs and symptoms  Outcome: Met This Shift  Goal: Absence of imbalanced fluid volume signs and symptoms  Description: Absence of imbalanced fluid volume signs and symptoms  Outcome: Met This Shift     Problem: Infection - Surgical Site:  Goal: Will show no infection signs and symptoms  Description: Will show no infection signs and symptoms  Outcome: Met This Shift     Problem: Mood - Altered:  Goal: Mood stable  Description: Mood stable  Outcome: Met This Shift     Problem: Nausea/Vomiting:  Goal: Absence of nausea/vomiting  Description: Absence of nausea/vomiting  Outcome: Met This Shift     Problem: Pain - Acute:  Goal: Pain level will decrease  Description: Pain level will decrease  Outcome: Met This Shift     Problem: Urinary Retention:  Goal: Urinary elimination within specified parameters  Description: Urinary elimination within specified parameters  Outcome: Met This Shift     Problem: Venous Thromboembolism:  Goal: Will show no signs or symptoms of venous thromboembolism  Description: Will show no signs or symptoms of venous thromboembolism  Outcome: Met This Shift  Goal: Absence of signs or symptoms of impaired coagulation  Description: Absence of signs or symptoms of impaired coagulation  Outcome: Met This Shift     Problem: Pain:  Goal: Pain level will decrease  Description: Pain level will decrease  Outcome: Met This Shift  Goal: Control of acute pain  Description: Control of acute pain  Outcome: Met This Shift  Goal: Control of chronic pain  Description: Control of chronic pain  Outcome: Met This Shift

## 2021-07-23 VITALS
OXYGEN SATURATION: 98 % | SYSTOLIC BLOOD PRESSURE: 127 MMHG | HEIGHT: 68 IN | DIASTOLIC BLOOD PRESSURE: 77 MMHG | HEART RATE: 86 BPM | WEIGHT: 240 LBS | RESPIRATION RATE: 18 BRPM | TEMPERATURE: 98.7 F | BODY MASS INDEX: 36.37 KG/M2

## 2021-07-23 PROBLEM — O34.211 MATERNAL CARE DUE TO LOW TRANSVERSE UTERINE SCAR FROM PREVIOUS CESAREAN DELIVERY: Status: ACTIVE | Noted: 2021-07-23

## 2021-07-23 LAB
METER GLUCOSE: 124 MG/DL (ref 74–99)
METER GLUCOSE: 138 MG/DL (ref 74–99)
METER GLUCOSE: 99 MG/DL (ref 74–99)

## 2021-07-23 PROCEDURE — 6370000000 HC RX 637 (ALT 250 FOR IP): Performed by: OBSTETRICS & GYNECOLOGY

## 2021-07-23 PROCEDURE — 90715 TDAP VACCINE 7 YRS/> IM: CPT | Performed by: OBSTETRICS & GYNECOLOGY

## 2021-07-23 PROCEDURE — 90471 IMMUNIZATION ADMIN: CPT | Performed by: OBSTETRICS & GYNECOLOGY

## 2021-07-23 PROCEDURE — 82962 GLUCOSE BLOOD TEST: CPT

## 2021-07-23 PROCEDURE — 6360000002 HC RX W HCPCS: Performed by: OBSTETRICS & GYNECOLOGY

## 2021-07-23 RX ORDER — OXYCODONE HYDROCHLORIDE AND ACETAMINOPHEN 5; 325 MG/1; MG/1
1 TABLET ORAL EVERY 6 HOURS PRN
Qty: 20 TABLET | Refills: 0 | Status: SHIPPED | OUTPATIENT
Start: 2021-07-23 | End: 2021-07-28

## 2021-07-23 RX ORDER — IBUPROFEN 800 MG/1
800 TABLET ORAL EVERY 6 HOURS PRN
Qty: 120 TABLET | Refills: 0 | Status: SHIPPED | OUTPATIENT
Start: 2021-07-23 | End: 2022-09-19

## 2021-07-23 RX ADMIN — ENOXAPARIN SODIUM 60 MG: 60 INJECTION SUBCUTANEOUS at 08:14

## 2021-07-23 RX ADMIN — TETANUS TOXOID, REDUCED DIPHTHERIA TOXOID AND ACELLULAR PERTUSSIS VACCINE, ADSORBED 0.5 ML: 5; 2.5; 8; 8; 2.5 SUSPENSION INTRAMUSCULAR at 15:57

## 2021-07-23 RX ADMIN — IBUPROFEN 800 MG: 800 TABLET, FILM COATED ORAL at 08:14

## 2021-07-23 RX ADMIN — OXYCODONE AND ACETAMINOPHEN 2 TABLET: 5; 325 TABLET ORAL at 04:50

## 2021-07-23 RX ADMIN — DOCUSATE SODIUM 100 MG: 100 CAPSULE ORAL at 08:14

## 2021-07-23 RX ADMIN — METFORMIN HYDROCHLORIDE 500 MG: 500 TABLET ORAL at 08:14

## 2021-07-23 RX ADMIN — IBUPROFEN 800 MG: 800 TABLET, FILM COATED ORAL at 14:14

## 2021-07-23 ASSESSMENT — PAIN SCALES - GENERAL
PAINLEVEL_OUTOF10: 10
PAINLEVEL_OUTOF10: 0
PAINLEVEL_OUTOF10: 8
PAINLEVEL_OUTOF10: 6

## 2021-07-23 NOTE — PROGRESS NOTES
Subjective:     Postpartum Day 2:  Delivery    The patient feels well. The patient denies emotional concerns. Pain is well controlled with current medications. The baby is well. Urinary output is adequate. The patient is ambulating well. The patient is tolerating a normal diet. Flatus has been passed. Objective:       Vitals:    21 0714   BP: (!) 105/55   Pulse: 81   Resp: 17   Temp: 98 °F (36.7 °C)   SpO2:          General:    alert, appears stated age and cooperative   Bowel Sounds:  active   Lochia:  appropriate   Uterine Fundus:   firm   Incision:  healing well, no significant drainage, no dehiscence, no significant erythema   DVT Evaluation:  No evidence of DVT seen on physical exam.     CBC   Lab Results   Component Value Date    WBC 10.3 2021    HGB 12.7 2021    HCT 38.0 2021    MCV 93.3 2021     2021        Assessment:     Status post  section. Doing well postoperatively. Plan:     Continue current care.

## 2021-07-23 NOTE — PROGRESS NOTES
Assumed care of pt for overnight shift, RN to bedside and POC discussed for continued post partum care with pt in agreement. New breast pump provided for mothers use. [place] to nursery for VS and assessment at this time, to return for rooming in tonight. Safe sleep practices reviewed. Denies pain or needs, call light in reach. Rest encouraged.

## 2021-07-23 NOTE — CARE COORDINATION
2021: SS Note:  Notified by Artie Emmanuel for [employer] that  may be released home with pt and her cousin, Matthew Lazo who is bringing a car seat to the hospital and transporting her home, their agency will continue to follow to confirm her address and transfer case to [place] [address] if necessary, nursing informed.  Electronically signed by KELVIN Benson on 2021 at 3:49 PM

## 2021-07-23 NOTE — PROGRESS NOTES
Assumed care of patient. Plan of care for shift reviewed with patient, verbalized understanding and all questions answered.  safe sleep reviewed with patient, verbalized understanding and all questions answered. Patient resting in bed, call light within reach.

## 2021-07-23 NOTE — PLAN OF CARE
Problem: Fluid Volume - Imbalance:  Goal: Absence of postpartum hemorrhage signs and symptoms  Description: Absence of postpartum hemorrhage signs and symptoms  7/23/2021 0110 by Nichelle Sewell RN  Outcome: Met This Shift  7/22/2021 1544 by Jamie Aguirre RN  Outcome: Met This Shift  Goal: Absence of imbalanced fluid volume signs and symptoms  Description: Absence of imbalanced fluid volume signs and symptoms  7/23/2021 0110 by Nichelle Sewell RN  Outcome: Met This Shift  7/22/2021 1544 by Jamie Aguirre RN  Outcome: Met This Shift     Problem: Infection - Surgical Site:  Goal: Will show no infection signs and symptoms  Description: Will show no infection signs and symptoms  7/23/2021 0110 by Nichelle Sewell RN  Outcome: Met This Shift  7/22/2021 1544 by Jamie Aguirre RN  Outcome: Met This Shift     Problem: Mood - Altered:  Goal: Mood stable  Description: Mood stable  7/23/2021 0110 by Nichelle Sewell RN  Outcome: Met This Shift  7/22/2021 1544 by Jamie Aguirre RN  Outcome: Met This Shift     Problem: Nausea/Vomiting:  Goal: Absence of nausea/vomiting  Description: Absence of nausea/vomiting  7/23/2021 0110 by Nichelle Sewell RN  Outcome: Met This Shift  7/22/2021 1544 by Jamie Aguirre RN  Outcome: Met This Shift     Problem: Pain - Acute:  Goal: Pain level will decrease  Description: Pain level will decrease  7/23/2021 0110 by Nichelle Sewell RN  Outcome: Met This Shift  7/22/2021 1544 by Jamie Aguirre RN  Outcome: Met This Shift     Problem: Urinary Retention:  Goal: Urinary elimination within specified parameters  Description: Urinary elimination within specified parameters  7/23/2021 0110 by Nichelle Sewell RN  Outcome: Met This Shift  7/22/2021 1544 by Jamie Aguirre RN  Outcome: Met This Shift     Problem: Venous Thromboembolism:  Goal: Will show no signs or symptoms of venous thromboembolism  Description: Will show no signs or symptoms of venous thromboembolism  7/23/2021 0110 by Esther Dennison RN  Outcome: Met This Shift  7/22/2021 1544 by Brit Baez RN  Outcome: Met This Shift  Goal: Absence of signs or symptoms of impaired coagulation  Description: Absence of signs or symptoms of impaired coagulation  7/23/2021 0110 by Esther Dennison RN  Outcome: Met This Shift  7/22/2021 1544 by Brit Baez RN  Outcome: Met This Shift     Problem: Pain:  Description: Pain management should include both nonpharmacologic and pharmacologic interventions.   Goal: Pain level will decrease  Description: Pain level will decrease  7/23/2021 0110 by Esther Dennison RN  Outcome: Met This Shift  7/22/2021 1544 by Brit Baez RN  Outcome: Met This Shift  Goal: Control of acute pain  Description: Control of acute pain  7/22/2021 1544 by Brit Baez RN  Outcome: Met This Shift  Goal: Control of chronic pain  Description: Control of chronic pain  7/22/2021 1544 by Brit Baez RN  Outcome: Met This Shift

## 2021-07-23 NOTE — DISCHARGE SUMMARY
Obstetrical Discharge Form         Patients Name  Kori Copeland    Gestational Age:  37w0d    Antepartum complications: gestational diabetes, uncontrolled. Poor prenatal care.     Date of Delivery:   2021       7:00 PM      Type of Delivery:   , Low Transverse [251]   Rupture Date/time:    No data found      No data found     Presentation:    Vertex [1]   Position:                      Anesthesia:    Spinal [252]     Feeding method:         Delivered By:   Tanmay MORATAYA     Baby:       Information for the patient's :  Levy Mojica [[id number]]          Intrapartum complications: None  Postpartum complications: none  Discharge Date:   2021  Discharge Condition: Stable  Disposition:   Home    Plan:   Follow up    in 6 weeks

## 2021-07-23 NOTE — PROGRESS NOTES
Subjective:     Postpartum Day 1:  Delivery    The patient feels well. The patient denies emotional concerns. Pain is well controlled with current medications. The baby is well. Urinary output is adequate. The patient is ambulating well. The patient is tolerating a normal diet. Flatus has been passed. Objective:       Vitals:    21 0714   BP: (!) 105/55   Pulse: 81   Resp: 17   Temp: 98 °F (36.7 °C)   SpO2:          General:    alert, appears stated age and cooperative   Bowel Sounds:  active   Lochia:  appropriate   Uterine Fundus:   firm   Incision:  healing well, no significant drainage, no dehiscence, no significant erythema   DVT Evaluation:  No evidence of DVT seen on physical exam.     CBC   Lab Results   Component Value Date    WBC 10.3 2021    HGB 12.7 2021    HCT 38.0 2021    MCV 93.3 2021     2021        Assessment:     Status post  section. Doing well postoperatively. Plan:     Continue current care.

## 2021-07-29 NOTE — PROGRESS NOTES
CLINICAL PHARMACY NOTE: MEDS TO BEDS    Total # of Prescriptions Filled: 2   The following medications were delivered to the patient:  · Oxycodone 5-325 mg  · Ibuprofen 800 mg    Additional Documentation:

## 2021-08-19 NOTE — H&P
Subjective:      Hill Dodd is an 26 y.o. female at 35 and 2/7 weeks gestation presenting with   Chief Complaint   Patient presents with    Other     33 2/7 week with contractions   . She is also complaining of contractions every a few minutes lasting few seconds. Other associated symptoms include low back pain. Fetal Movement: normal.  Past Medical History:   Diagnosis Date    Anxiety     Bipolar 1 disorder ([facility]      delivery delivered 2021    Depression     Diabetes mellitus ([facility]     FH: multiple miscarriages or stillbirths     Hypertension     Maternal care due to low transverse uterine scar from previous  delivery 2021    Septal defect, atrial     Shingles     Thyroid disease        Review of Systems  Pertinent items are noted in HPI. Objective:      BP (!) 116/57   Pulse 86   Temp 97.5 °F (36.4 °C)   Resp 16   Ht 5' 8\" (1.727 m)   Wt 240 lb (108.9 kg)   LMP 2020   BMI 36.49 kg/m²   Blood pressure (!) 116/57, pulse 86, temperature 97.5 °F (36.4 °C), resp. rate 16, height 5' 8\" (1.727 m), weight 240 lb (108.9 kg), last menstrual period 2020, unknown if currently breastfeeding. General:   alert, appears stated age and cooperative   Cervix:   closed.    FHT:   140 BPM     Lab Review    CBC:   Lab Results   Component Value Date    WBC 10.3 2021    RBC 3.87 2021    HGB 12.7 2021    HCT 38.0 2021    MCV 93.3 2021    MCH 31.8 2021    MCHC 34.1 2021    RDW 12.7 2021     2021    MPV 11.1 2021     CMP:    Lab Results   Component Value Date     2021    K 4.5 2021    K 4.5 2020     2021    CO2 20 2021    BUN 10 2021    CREATININE 0.5 2021    GFRAA >60 2021    LABGLOM >60 2021    GLUCOSE 119 2021    GLUCOSE 302 2012    PROT 6.4 2021    LABALBU 3.0 2021    LABALBU 4.4 2012    CALCIUM 9.4 07/19/2021    BILITOT <0.2 07/19/2021    ALKPHOS 197 07/19/2021    AST 17 07/19/2021    ALT 5 07/19/2021     U/A:    Lab Results   Component Value Date    COLORU Yellow 07/13/2021    PHUR 6.5 07/13/2021    WBCUA 5-10 07/13/2021    RBCUA 0-1 07/13/2021    TRICHOMONAS Present 07/13/2021    BACTERIA MODERATE 07/13/2021    CLARITYU SLCLOUDY 07/13/2021    SPECGRAV 1.015 07/13/2021    LEUKOCYTESUR MODERATE 07/13/2021    UROBILINOGEN 0.2 07/13/2021    BILIRUBINUR Negative 07/13/2021    BLOODU TRACE 07/13/2021    GLUCOSEU >=1000 07/13/2021          Assessment:      Threatened Premature Labor      Plan:      Monitored for contractions - findings: uterine irritability and reactive strip. Orders: no treatment necessary.      Paz Villar MD,MD,8/19/2021 8:46 AM

## 2022-08-15 ENCOUNTER — TELEPHONE (OUTPATIENT)
Dept: OBGYN CLINIC | Age: 32
End: 2022-08-15

## 2022-08-15 NOTE — TELEPHONE ENCOUNTER
I called due to missed/no call no show appt and pt didn't answer and no voicemail was set up to leave a message.

## 2022-09-13 ENCOUNTER — TELEPHONE (OUTPATIENT)
Dept: OBGYN CLINIC | Age: 32
End: 2022-09-13

## 2022-09-13 NOTE — TELEPHONE ENCOUNTER
I called to reschedule no answer left voicemail for pt to call back and move appt up and also switch appt back to the [address] location.

## 2022-09-19 ENCOUNTER — ANCILLARY PROCEDURE (OUTPATIENT)
Dept: OBGYN CLINIC | Age: 32
End: 2022-09-19
Payer: COMMERCIAL

## 2022-09-19 ENCOUNTER — INITIAL PRENATAL (OUTPATIENT)
Dept: OBGYN CLINIC | Age: 32
End: 2022-09-19
Payer: COMMERCIAL

## 2022-09-19 VITALS
DIASTOLIC BLOOD PRESSURE: 66 MMHG | HEART RATE: 103 BPM | WEIGHT: 214 LBS | BODY MASS INDEX: 32.54 KG/M2 | SYSTOLIC BLOOD PRESSURE: 103 MMHG

## 2022-09-19 DIAGNOSIS — Z3A.28 28 WEEKS GESTATION OF PREGNANCY: ICD-10-CM

## 2022-09-19 DIAGNOSIS — O24.113 PREGNANCY WITH TYPE 2 DIABETES MELLITUS IN THIRD TRIMESTER: Primary | ICD-10-CM

## 2022-09-19 DIAGNOSIS — N96 HABITUAL ABORTER: ICD-10-CM

## 2022-09-19 PROBLEM — O99.213 OBESITY COMPLICATING PREGNANCY, THIRD TRIMESTER: Status: ACTIVE | Noted: 2021-04-12

## 2022-09-19 PROBLEM — Q21.10 ASD (ATRIAL SEPTAL DEFECT): Status: RESOLVED | Noted: 2021-04-12 | Resolved: 2022-09-19

## 2022-09-19 PROBLEM — O26.93 COMPLICATION OF PREGNANCY, THIRD TRIMESTER: Status: RESOLVED | Noted: 2021-07-14 | Resolved: 2022-09-19

## 2022-09-19 PROBLEM — O99.333 HIGH RISK PREGNANCY DUE TO SMOKING IN THIRD TRIMESTER: Status: ACTIVE | Noted: 2021-04-12

## 2022-09-19 PROBLEM — Q76.49 CAUDAL REGRESSION SYNDROME: Status: RESOLVED | Noted: 2021-04-12 | Resolved: 2022-09-19

## 2022-09-19 PROCEDURE — 99213 OFFICE O/P EST LOW 20 MIN: CPT

## 2022-09-19 PROCEDURE — 99203 OFFICE O/P NEW LOW 30 MIN: CPT | Performed by: OBSTETRICS & GYNECOLOGY

## 2022-09-19 PROCEDURE — 76811 OB US DETAILED SNGL FETUS: CPT | Performed by: OBSTETRICS & GYNECOLOGY

## 2022-09-19 RX ORDER — BLOOD-GLUCOSE METER
1 KIT MISCELLANEOUS DAILY
Qty: 1 KIT | Refills: 0 | Status: SHIPPED | OUTPATIENT
Start: 2022-09-19

## 2022-09-19 RX ORDER — GLUCOSAMINE HCL/CHONDROITIN SU 500-400 MG
CAPSULE ORAL
Qty: 120 STRIP | Refills: 3 | Status: SHIPPED | OUTPATIENT
Start: 2022-09-19

## 2022-09-19 RX ORDER — LANCETS 30 GAUGE
1 EACH MISCELLANEOUS 4 TIMES DAILY
Qty: 200 EACH | Refills: 0 | Status: SHIPPED | OUTPATIENT
Start: 2022-09-19

## 2022-09-19 NOTE — PROGRESS NOTES
[address] [facility]  [address] [facility] [address]  Dept: [phone]  Loc: [phone]     2022    RE:  Guy Dsouza     : 1990   AGE: 33 y.o. Dear Dr. Tonie Pardo,    Thank you for allowing me to provide prenatal consultation for Guy Dsouza. As I'm sure you will recall, Guy Dsouza is a 33 y.o. [id number]  Patient's last menstrual period was 2022.  Estimated Date of Delivery: 22 at 28w3d seen in our office today for the following:    REASON FOR CONSULTATION:  Diabetic management    Patient Active Problem List    Diagnosis Date Noted    Maternal care due to low transverse uterine scar from previous  delivery 2021    28 weeks gestation of pregnancy     Complicated pregnancy, third trimester 2021    Type 2 diabetes mellitus complicating pregnancy, antepartum, second trimester 2021    Essential hypertension 2021    Habitual aborter, antepartum     Obesity complicating pregnancy, third trimester 2021    Thyroid dysfunction, antepartum 2021    Hereditary disease in family possibly affecting fetus, affecting management of mother, antepartum condition or complication, single gestation 2021    High risk pregnancy due to smoking in third trimester 2021    Bipolar disease during pregnancy in second trimester ([facility] [address] 2021       PAST HISTORY:  OB History    Para Term  AB Living   13 3 3 0 9 2   SAB IAB Ectopic Molar Multiple Live Births   9 0 0 0 0 2      # Outcome Date GA Lbr Jas/2nd Weight Sex Delivery Anes PTL Lv   13 Current            12 Term 21 37w0d  8 lb 9 oz (3.884 kg) M CS-LTranv Spinal N JAQUELIN   11 Term 11/15/17   5 lb 7 oz (2.466 kg) F CS-LTranv Spinal        Complications: Pre-eclampsia   10 Term 07   5 lb 4 oz (2.381 kg) F Vag-Spont EPI  JAQUELIN   9 SAB            8 SAB            7 SAB 6 SAB            5 SAB            4 SAB            3 SAB            2 SAB            1 SAB               Obstetric Comments   9 SAB          MEDICAL:  Past Medical History:   Diagnosis Date    Anxiety     ASD (atrial septal defect) 2021    - She has an ASD - No surgical repair   Recommend; Cardiology consultation echocardiogram    Asthma     Bipolar 1 disorder ([facility] [address]      delivery delivered 2021    Chlamydia     Complication of pregnancy, third trimester 2021    Depression     Diabetes mellitus ([facility] [address]     FH: multiple miscarriages or stillbirths     Hypertension     Hypothyroidism     Maternal care due to low transverse uterine scar from previous  delivery 2021    Pre-eclampsia     Septal defect, atrial     Shingles     STD (sexually transmitted disease)     Thyroid disease     Type 2 diabetes mellitus without complication ([facility]         SURGICAL:  Past Surgical History:   Procedure Laterality Date    ABDOMEN SURGERY       SECTION  2017     SECTION N/A 2021    REPEAT  SECTION performed by Alysha Serra MD at [facility] L[facility] OR    DILATION AND CURETTAGE      DILATION AND CURETTAGE OF UTERUS      ENDOSCOPY, COLON, DIAGNOSTIC         ALLERGIES:    Allergies   Allergen Reactions    Bee Venom     Raspberry          MEDICATIONS:    Current Outpatient Medications   Medication Sig Dispense Refill    Prenatal Vit-Fe Fumarate-FA (PRENATAL VITAMINS PO) Take 1 tablet by mouth daily      glucose monitoring (FREESTYLE FREEDOM) kit 1 kit by Does not apply route daily 1 kit 0    glucose monitoring (FREESTYLE FREEDOM) kit 1 kit by Does not apply route daily 1 kit 0    Lancets MISC 1 each by Does not apply route 4 times daily 200 each 0    blood glucose monitor strips Test 4 times a day & as needed for symptoms of irregular blood glucose. Dispense sufficient amount for indicated testing frequency plus additional to accommodate PRN testing needs.  120 strip 3 metoprolol succinate (TOPROL XL) 25 MG extended release tablet Take 25 mg by mouth daily (Patient not taking: Reported on 9/19/2022)      ACYCLOVIR PO Take 500 mg by mouth 2 times daily as needed  (Patient not taking: No sig reported)      metFORMIN (GLUCOPHAGE) 500 MG tablet Take 500 mg by mouth 2 times daily (with meals) Taking 500 mg lunch and dinner (Patient not taking: Reported on 9/19/2022)       No current facility-administered medications for this visit. Social History     Socioeconomic History    Marital status: Single     Spouse name: None    Number of children: None    Years of education: None    Highest education level: None   Tobacco Use    Smoking status: Every Day     Packs/day: 0.50     Years: 6.00     Pack years: 3.00     Types: Cigarettes    Smokeless tobacco: Never   Vaping Use    Vaping Use: Never used   Substance and Sexual Activity    Alcohol use: Not Currently     Comment: occasionally    Drug use: Not Currently     Frequency: 1.0 times per week     Types: Marijuana (Weed)    Sexual activity: Yes     Partners: Male          FAMILY MEDICAL HISTORY:   Family History   Problem Relation Age of Onset    Heart Disease Mother     Diabetes Mother     Stroke Mother     Heart Disease Maternal Grandfather           PHYSICAL EXAMINATION:  /66   Pulse (!) 103   Wt 214 lb (97.1 kg)   LMP 03/04/2022   Body mass index is 32.54 kg/m². Urine dipstick:  No results found for this visit on 09/19/22. An ultrasound evaluation was done in our office today. Please refer to the enclosed copy of the ultrasound report for further information. Lab Work Review:  The patient has not been checking blood sugars so there were no blood sugars for me to review. She does not have a glucometer, she does not have test strips, she does not have lancets.   I will also try to order a continuous glucose monitor to help improve patient compliance we discussed diet and dietary modifications. IMPRESSION:  Single intrauterine gestation at 28+ weeks with type II pregestational diabetes. There is been appropriate interval growth. The baby is AGA. Estimated fetal weight is at the 62nd percentile 1344 g 2 pounds 15 ounces. The baby is in a cephalic presentation. Fetal cardiac motion and fetal motion was detected and appeared to be grossly normal.  The placenta is anterior. Amniotic fluid volume is normal.    RECOMMENDATIONS:  I have ordered all of her glucose testing supplies. I have ordered a glucometer, lancets, test strips, and I have tried to order a continuous glucose monitor as I think it will improve her compliance. She is to return in 1 week for blood sugar management. She is to return in 2 weeks for blood sugar management. Given that she has a history of chronic hypertension and the type 2 diabetes I would recommend twice-weekly testing beginning at 32 weeks gestation. Delivery will depend on diabetic management. She will need growth ultrasounds every 4 weeks as well. 7.  Follow-up would be otherwise as clinically indicated. Each of the recommendations were discussed with the patient:       PLAN:    As noted above or sooner prn. Sincerely,        Bettie Taylor MD    I spent 35 minutes of direct contact time with the patient of which greater than 50% of the time was used to  the patient, discuss complications and problems related to her pregnancy, or coordinating her care. I answered all of her questions to her satisfaction.

## 2022-09-19 NOTE — PROGRESS NOTES
Patient is here today for ob new visit. Denies any vaginal bleeding, cramping, or leakage of fluids. Patient reports good fetal movement.    Currently unable to urinate

## 2022-11-30 ENCOUNTER — HOSPITAL ENCOUNTER (INPATIENT)
Age: 32
LOS: 3 days | Discharge: HOME | DRG: 540 | End: 2022-12-03
Attending: OBSTETRICS & GYNECOLOGY | Admitting: OBSTETRICS & GYNECOLOGY
Payer: COMMERCIAL

## 2022-11-30 ENCOUNTER — ANESTHESIA (OUTPATIENT)
Dept: LABOR AND DELIVERY | Age: 32
End: 2022-11-30
Payer: COMMERCIAL

## 2022-11-30 ENCOUNTER — ANESTHESIA EVENT (OUTPATIENT)
Dept: LABOR AND DELIVERY | Age: 32
End: 2022-11-30
Payer: COMMERCIAL

## 2022-11-30 DIAGNOSIS — O34.219 PREVIOUS CESAREAN DELIVERY AFFECTING PREGNANCY: Primary | ICD-10-CM

## 2022-11-30 PROBLEM — O98.413 CHRONIC HEPATITIS C AFFECTING ANTEPARTUM CARE OF MOTHER IN THIRD TRIMESTER (HCC): Status: ACTIVE | Noted: 2022-11-30

## 2022-11-30 PROBLEM — B18.2 CHRONIC HEPATITIS C AFFECTING ANTEPARTUM CARE OF MOTHER IN THIRD TRIMESTER (HCC): Status: ACTIVE | Noted: 2022-11-30

## 2022-11-30 PROBLEM — O24.113 TYPE 2 DIABETES MELLITUS AFFECTING PREGNANCY IN THIRD TRIMESTER, ANTEPARTUM: Status: ACTIVE | Noted: 2021-04-12

## 2022-11-30 PROBLEM — Z3A.38 38 WEEKS GESTATION OF PREGNANCY: Status: ACTIVE | Noted: 2022-11-30

## 2022-11-30 LAB
ABO/RH: NORMAL
AMPHETAMINE SCREEN, URINE: NOT DETECTED
ANTIBODY SCREEN: NORMAL
BARBITURATE SCREEN URINE: NOT DETECTED
BENZODIAZEPINE SCREEN, URINE: NOT DETECTED
CANNABINOID SCREEN URINE: NOT DETECTED
COCAINE METABOLITE SCREEN URINE: NOT DETECTED
FENTANYL SCREEN, URINE: NOT DETECTED
HCT VFR BLD CALC: 40.1 % (ref 34–48)
HEMOGLOBIN: 13.5 G/DL (ref 11.5–15.5)
Lab: NORMAL
MCH RBC QN AUTO: 31.7 PG (ref 26–35)
MCHC RBC AUTO-ENTMCNC: 33.7 % (ref 32–34.5)
MCV RBC AUTO: 94.1 FL (ref 80–99.9)
METHADONE SCREEN, URINE: NOT DETECTED
OPIATE SCREEN URINE: NOT DETECTED
OXYCODONE URINE: NOT DETECTED
PDW BLD-RTO: 14.4 FL (ref 11.5–15)
PHENCYCLIDINE SCREEN URINE: NOT DETECTED
PLATELET # BLD: 225 E9/L (ref 130–450)
PMV BLD AUTO: 11.2 FL (ref 7–12)
RBC # BLD: 4.26 E12/L (ref 3.5–5.5)
WBC # BLD: 10.8 E9/L (ref 4.5–11.5)

## 2022-11-30 PROCEDURE — 80307 DRUG TEST PRSMV CHEM ANLYZR: CPT

## 2022-11-30 PROCEDURE — 1220000000 HC SEMI PRIVATE OB R&B

## 2022-11-30 PROCEDURE — 6360000002 HC RX W HCPCS: Performed by: NURSE ANESTHETIST, CERTIFIED REGISTERED

## 2022-11-30 PROCEDURE — 3700000001 HC ADD 15 MINUTES (ANESTHESIA): Performed by: OBSTETRICS & GYNECOLOGY

## 2022-11-30 PROCEDURE — 7100000001 HC PACU RECOVERY - ADDTL 15 MIN: Performed by: OBSTETRICS & GYNECOLOGY

## 2022-11-30 PROCEDURE — 3609079900 HC CESAREAN SECTION: Performed by: OBSTETRICS & GYNECOLOGY

## 2022-11-30 PROCEDURE — 6370000000 HC RX 637 (ALT 250 FOR IP): Performed by: OBSTETRICS & GYNECOLOGY

## 2022-11-30 PROCEDURE — 6360000002 HC RX W HCPCS: Performed by: OBSTETRICS & GYNECOLOGY

## 2022-11-30 PROCEDURE — 88307 TISSUE EXAM BY PATHOLOGIST: CPT

## 2022-11-30 PROCEDURE — 87491 CHLMYD TRACH DNA AMP PROBE: CPT

## 2022-11-30 PROCEDURE — 87591 N.GONORRHOEAE DNA AMP PROB: CPT

## 2022-11-30 PROCEDURE — 6370000000 HC RX 637 (ALT 250 FOR IP): Performed by: ANESTHESIOLOGY

## 2022-11-30 PROCEDURE — 86900 BLOOD TYPING SEROLOGIC ABO: CPT

## 2022-11-30 PROCEDURE — 2580000003 HC RX 258: Performed by: NURSE ANESTHETIST, CERTIFIED REGISTERED

## 2022-11-30 PROCEDURE — 6360000002 HC RX W HCPCS

## 2022-11-30 PROCEDURE — 6370000000 HC RX 637 (ALT 250 FOR IP)

## 2022-11-30 PROCEDURE — 85027 COMPLETE CBC AUTOMATED: CPT

## 2022-11-30 PROCEDURE — 6360000002 HC RX W HCPCS: Performed by: ANESTHESIOLOGY

## 2022-11-30 PROCEDURE — 59514 CESAREAN DELIVERY ONLY: CPT | Performed by: PHYSICIAN ASSISTANT

## 2022-11-30 PROCEDURE — 2580000003 HC RX 258: Performed by: OBSTETRICS & GYNECOLOGY

## 2022-11-30 PROCEDURE — 2500000003 HC RX 250 WO HCPCS: Performed by: NURSE ANESTHETIST, CERTIFIED REGISTERED

## 2022-11-30 PROCEDURE — 86901 BLOOD TYPING SEROLOGIC RH(D): CPT

## 2022-11-30 PROCEDURE — 3700000000 HC ANESTHESIA ATTENDED CARE: Performed by: OBSTETRICS & GYNECOLOGY

## 2022-11-30 PROCEDURE — 7100000000 HC PACU RECOVERY - FIRST 15 MIN: Performed by: OBSTETRICS & GYNECOLOGY

## 2022-11-30 PROCEDURE — 86592 SYPHILIS TEST NON-TREP QUAL: CPT

## 2022-11-30 PROCEDURE — 36415 COLL VENOUS BLD VENIPUNCTURE: CPT

## 2022-11-30 PROCEDURE — 99221 1ST HOSP IP/OBS SF/LOW 40: CPT | Performed by: NURSE PRACTITIONER

## 2022-11-30 PROCEDURE — 2709999900 HC NON-CHARGEABLE SUPPLY: Performed by: OBSTETRICS & GYNECOLOGY

## 2022-11-30 PROCEDURE — 86850 RBC ANTIBODY SCREEN: CPT

## 2022-11-30 RX ORDER — ONDANSETRON 2 MG/ML
4 INJECTION INTRAMUSCULAR; INTRAVENOUS EVERY 6 HOURS PRN
Status: DISCONTINUED | OUTPATIENT
Start: 2022-11-30 | End: 2022-11-30 | Stop reason: SDUPTHER

## 2022-11-30 RX ORDER — TRISODIUM CITRATE DIHYDRATE AND CITRIC ACID MONOHYDRATE 500; 334 MG/5ML; MG/5ML
SOLUTION ORAL
Status: COMPLETED
Start: 2022-11-30 | End: 2022-11-30

## 2022-11-30 RX ORDER — ONDANSETRON 2 MG/ML
INJECTION INTRAMUSCULAR; INTRAVENOUS PRN
Status: DISCONTINUED | OUTPATIENT
Start: 2022-11-30 | End: 2022-11-30 | Stop reason: SDUPTHER

## 2022-11-30 RX ORDER — DIPHENHYDRAMINE HCL 25 MG
25 TABLET ORAL EVERY 6 HOURS PRN
Status: DISPENSED | OUTPATIENT
Start: 2022-11-30 | End: 2022-12-01

## 2022-11-30 RX ORDER — PRENATAL WITH FERROUS FUM AND FOLIC ACID 3080; 920; 120; 400; 22; 1.84; 3; 20; 10; 1; 12; 200; 27; 25; 2 [IU]/1; [IU]/1; MG/1; [IU]/1; MG/1; MG/1; MG/1; MG/1; MG/1; MG/1; UG/1; MG/1; MG/1; MG/1; MG/1
1 TABLET ORAL DAILY
Status: DISCONTINUED | OUTPATIENT
Start: 2022-11-30 | End: 2022-12-03 | Stop reason: HOSPADM

## 2022-11-30 RX ORDER — FENTANYL CITRATE 50 UG/ML
50 INJECTION, SOLUTION INTRAMUSCULAR; INTRAVENOUS EVERY 5 MIN PRN
Status: DISCONTINUED | OUTPATIENT
Start: 2022-11-30 | End: 2022-11-30 | Stop reason: HOSPADM

## 2022-11-30 RX ORDER — SODIUM CHLORIDE 0.9 % (FLUSH) 0.9 %
5-40 SYRINGE (ML) INJECTION PRN
Status: DISCONTINUED | OUTPATIENT
Start: 2022-11-30 | End: 2022-12-03 | Stop reason: HOSPADM

## 2022-11-30 RX ORDER — GLYCOPYRROLATE 1 MG/5 ML
SYRINGE (ML) INTRAVENOUS PRN
Status: DISCONTINUED | OUTPATIENT
Start: 2022-11-30 | End: 2022-11-30 | Stop reason: SDUPTHER

## 2022-11-30 RX ORDER — SODIUM CHLORIDE, SODIUM LACTATE, POTASSIUM CHLORIDE, CALCIUM CHLORIDE 600; 310; 30; 20 MG/100ML; MG/100ML; MG/100ML; MG/100ML
INJECTION, SOLUTION INTRAVENOUS CONTINUOUS
Status: DISCONTINUED | OUTPATIENT
Start: 2022-11-30 | End: 2022-11-30

## 2022-11-30 RX ORDER — CEFAZOLIN 2 G/1
INJECTION, POWDER, FOR SOLUTION INTRAMUSCULAR; INTRAVENOUS
Status: DISCONTINUED
Start: 2022-11-30 | End: 2022-11-30

## 2022-11-30 RX ORDER — BUPIVACAINE HYDROCHLORIDE 7.5 MG/ML
INJECTION, SOLUTION INTRASPINAL PRN
Status: DISCONTINUED | OUTPATIENT
Start: 2022-11-30 | End: 2022-11-30 | Stop reason: SDUPTHER

## 2022-11-30 RX ORDER — WATER 1000 ML/1000ML
INJECTION, SOLUTION INTRAVENOUS
Status: DISCONTINUED
Start: 2022-11-30 | End: 2022-11-30

## 2022-11-30 RX ORDER — MORPHINE SULFATE 10 MG/ML
INJECTION, SOLUTION INTRAMUSCULAR; INTRAVENOUS PRN
Status: DISCONTINUED | OUTPATIENT
Start: 2022-11-30 | End: 2022-11-30 | Stop reason: SDUPTHER

## 2022-11-30 RX ORDER — OXYCODONE HYDROCHLORIDE 5 MG/1
5 TABLET ORAL EVERY 4 HOURS PRN
Status: DISCONTINUED | OUTPATIENT
Start: 2022-12-01 | End: 2022-12-03 | Stop reason: HOSPADM

## 2022-11-30 RX ORDER — TRISODIUM CITRATE DIHYDRATE AND CITRIC ACID MONOHYDRATE 500; 334 MG/5ML; MG/5ML
30 SOLUTION ORAL ONCE
Status: COMPLETED | OUTPATIENT
Start: 2022-11-30 | End: 2022-11-30

## 2022-11-30 RX ORDER — OXYCODONE HYDROCHLORIDE 5 MG/1
5 TABLET ORAL EVERY 4 HOURS PRN
Status: DISPENSED | OUTPATIENT
Start: 2022-11-30 | End: 2022-12-01

## 2022-11-30 RX ORDER — SODIUM CHLORIDE, SODIUM LACTATE, POTASSIUM CHLORIDE, CALCIUM CHLORIDE 600; 310; 30; 20 MG/100ML; MG/100ML; MG/100ML; MG/100ML
INJECTION, SOLUTION INTRAVENOUS CONTINUOUS PRN
Status: DISCONTINUED | OUTPATIENT
Start: 2022-11-30 | End: 2022-11-30 | Stop reason: SDUPTHER

## 2022-11-30 RX ORDER — SIMETHICONE 80 MG
80 TABLET,CHEWABLE ORAL
Status: DISCONTINUED | OUTPATIENT
Start: 2022-12-01 | End: 2022-12-03 | Stop reason: HOSPADM

## 2022-11-30 RX ORDER — MODIFIED LANOLIN
OINTMENT (GRAM) TOPICAL
Status: DISCONTINUED | OUTPATIENT
Start: 2022-11-30 | End: 2022-12-03 | Stop reason: HOSPADM

## 2022-11-30 RX ORDER — SODIUM CHLORIDE, SODIUM LACTATE, POTASSIUM CHLORIDE, CALCIUM CHLORIDE 600; 310; 30; 20 MG/100ML; MG/100ML; MG/100ML; MG/100ML
INJECTION, SOLUTION INTRAVENOUS CONTINUOUS
Status: DISCONTINUED | OUTPATIENT
Start: 2022-11-30 | End: 2022-12-03 | Stop reason: HOSPADM

## 2022-11-30 RX ORDER — PROCHLORPERAZINE EDISYLATE 5 MG/ML
5 INJECTION INTRAMUSCULAR; INTRAVENOUS
Status: DISCONTINUED | OUTPATIENT
Start: 2022-11-30 | End: 2022-11-30 | Stop reason: HOSPADM

## 2022-11-30 RX ORDER — OXYCODONE HYDROCHLORIDE 5 MG/1
10 TABLET ORAL EVERY 4 HOURS PRN
Status: DISCONTINUED | OUTPATIENT
Start: 2022-12-01 | End: 2022-12-03 | Stop reason: HOSPADM

## 2022-11-30 RX ORDER — SODIUM CHLORIDE 9 MG/ML
INJECTION, SOLUTION INTRAVENOUS PRN
Status: DISCONTINUED | OUTPATIENT
Start: 2022-11-30 | End: 2022-11-30 | Stop reason: HOSPADM

## 2022-11-30 RX ORDER — SODIUM CHLORIDE 0.9 % (FLUSH) 0.9 %
5-40 SYRINGE (ML) INJECTION PRN
Status: DISCONTINUED | OUTPATIENT
Start: 2022-11-30 | End: 2022-11-30 | Stop reason: HOSPADM

## 2022-11-30 RX ORDER — OXYCODONE HYDROCHLORIDE 5 MG/1
10 TABLET ORAL EVERY 4 HOURS PRN
Status: DISPENSED | OUTPATIENT
Start: 2022-11-30 | End: 2022-12-01

## 2022-11-30 RX ORDER — DIPHENHYDRAMINE HYDROCHLORIDE 50 MG/ML
INJECTION INTRAMUSCULAR; INTRAVENOUS
Status: COMPLETED
Start: 2022-11-30 | End: 2022-11-30

## 2022-11-30 RX ORDER — FERROUS SULFATE 325(65) MG
325 TABLET ORAL 2 TIMES DAILY WITH MEALS
Status: DISCONTINUED | OUTPATIENT
Start: 2022-11-30 | End: 2022-12-03 | Stop reason: HOSPADM

## 2022-11-30 RX ORDER — KETOROLAC TROMETHAMINE 30 MG/ML
30 INJECTION, SOLUTION INTRAMUSCULAR; INTRAVENOUS EVERY 6 HOURS PRN
Status: DISCONTINUED | OUTPATIENT
Start: 2022-11-30 | End: 2022-11-30 | Stop reason: HOSPADM

## 2022-11-30 RX ORDER — ONDANSETRON 4 MG/1
8 TABLET, ORALLY DISINTEGRATING ORAL EVERY 8 HOURS PRN
Status: DISCONTINUED | OUTPATIENT
Start: 2022-11-30 | End: 2022-12-03 | Stop reason: HOSPADM

## 2022-11-30 RX ORDER — SODIUM CHLORIDE 0.9 % (FLUSH) 0.9 %
5-40 SYRINGE (ML) INJECTION EVERY 12 HOURS SCHEDULED
Status: DISCONTINUED | OUTPATIENT
Start: 2022-11-30 | End: 2022-11-30 | Stop reason: HOSPADM

## 2022-11-30 RX ORDER — SODIUM CHLORIDE 0.9 % (FLUSH) 0.9 %
5-40 SYRINGE (ML) INJECTION EVERY 12 HOURS SCHEDULED
Status: DISCONTINUED | OUTPATIENT
Start: 2022-11-30 | End: 2022-12-03 | Stop reason: HOSPADM

## 2022-11-30 RX ORDER — FENTANYL CITRATE 50 UG/ML
25 INJECTION, SOLUTION INTRAMUSCULAR; INTRAVENOUS EVERY 5 MIN PRN
Status: DISCONTINUED | OUTPATIENT
Start: 2022-11-30 | End: 2022-11-30 | Stop reason: HOSPADM

## 2022-11-30 RX ORDER — DIPHENHYDRAMINE HYDROCHLORIDE 50 MG/ML
25 INJECTION INTRAMUSCULAR; INTRAVENOUS EVERY 6 HOURS PRN
Status: DISPENSED | OUTPATIENT
Start: 2022-11-30 | End: 2022-12-01

## 2022-11-30 RX ORDER — DOCUSATE SODIUM 100 MG/1
100 CAPSULE, LIQUID FILLED ORAL 2 TIMES DAILY
Status: DISCONTINUED | OUTPATIENT
Start: 2022-11-30 | End: 2022-12-03 | Stop reason: HOSPADM

## 2022-11-30 RX ORDER — SODIUM CHLORIDE, SODIUM LACTATE, POTASSIUM CHLORIDE, AND CALCIUM CHLORIDE .6; .31; .03; .02 G/100ML; G/100ML; G/100ML; G/100ML
1000 INJECTION, SOLUTION INTRAVENOUS ONCE
Status: COMPLETED | OUTPATIENT
Start: 2022-11-30 | End: 2022-11-30

## 2022-11-30 RX ORDER — SODIUM CHLORIDE 9 MG/ML
INJECTION, SOLUTION INTRAVENOUS PRN
Status: DISCONTINUED | OUTPATIENT
Start: 2022-11-30 | End: 2022-12-03 | Stop reason: HOSPADM

## 2022-11-30 RX ORDER — ONDANSETRON 2 MG/ML
4 INJECTION INTRAMUSCULAR; INTRAVENOUS EVERY 6 HOURS PRN
Status: ACTIVE | OUTPATIENT
Start: 2022-11-30 | End: 2022-12-01

## 2022-11-30 RX ORDER — KETOROLAC TROMETHAMINE 30 MG/ML
15 INJECTION, SOLUTION INTRAMUSCULAR; INTRAVENOUS EVERY 6 HOURS PRN
Status: DISPENSED | OUTPATIENT
Start: 2022-11-30 | End: 2022-12-01

## 2022-11-30 RX ORDER — IBUPROFEN 800 MG/1
800 TABLET ORAL EVERY 8 HOURS PRN
Status: DISCONTINUED | OUTPATIENT
Start: 2022-12-01 | End: 2022-12-03 | Stop reason: HOSPADM

## 2022-11-30 RX ORDER — ONDANSETRON 2 MG/ML
4 INJECTION INTRAMUSCULAR; INTRAVENOUS EVERY 6 HOURS PRN
Status: DISCONTINUED | OUTPATIENT
Start: 2022-11-30 | End: 2022-12-03 | Stop reason: HOSPADM

## 2022-11-30 RX ADMIN — SODIUM CITRATE AND CITRIC ACID MONOHYDRATE 30 ML: 500; 334 SOLUTION ORAL at 13:58

## 2022-11-30 RX ADMIN — DIPHENHYDRAMINE HYDROCHLORIDE 25 MG: 50 INJECTION, SOLUTION INTRAMUSCULAR; INTRAVENOUS at 17:29

## 2022-11-30 RX ADMIN — WATER 2000 MG: 1 INJECTION INTRAMUSCULAR; INTRAVENOUS; SUBCUTANEOUS at 14:00

## 2022-11-30 RX ADMIN — KETOROLAC TROMETHAMINE 30 MG: 30 INJECTION, SOLUTION INTRAMUSCULAR at 17:18

## 2022-11-30 RX ADMIN — Medication 909 ML/HR: at 14:37

## 2022-11-30 RX ADMIN — PHENYLEPHRINE HYDROCHLORIDE 300 MCG: 10 INJECTION INTRAVENOUS at 14:30

## 2022-11-30 RX ADMIN — SODIUM CHLORIDE, POTASSIUM CHLORIDE, SODIUM LACTATE AND CALCIUM CHLORIDE 1000 ML: 600; 310; 30; 20 INJECTION, SOLUTION INTRAVENOUS at 13:51

## 2022-11-30 RX ADMIN — Medication 0.2 MG: at 14:30

## 2022-11-30 RX ADMIN — DIPHENHYDRAMINE HYDROCHLORIDE 25 MG: 50 INJECTION, SOLUTION INTRAMUSCULAR; INTRAVENOUS at 23:51

## 2022-11-30 RX ADMIN — TRISODIUM CITRATE DIHYDRATE AND CITRIC ACID MONOHYDRATE 30 ML: 500; 334 SOLUTION ORAL at 13:58

## 2022-11-30 RX ADMIN — ONDANSETRON 4 MG: 2 INJECTION INTRAMUSCULAR; INTRAVENOUS at 14:17

## 2022-11-30 RX ADMIN — SODIUM CHLORIDE, POTASSIUM CHLORIDE, SODIUM LACTATE AND CALCIUM CHLORIDE: 600; 310; 30; 20 INJECTION, SOLUTION INTRAVENOUS at 14:11

## 2022-11-30 RX ADMIN — PHENYLEPHRINE HYDROCHLORIDE 200 MCG: 10 INJECTION INTRAVENOUS at 14:28

## 2022-11-30 RX ADMIN — MORPHINE SULFATE 0.15 MG: 10 INJECTION, SOLUTION INTRAMUSCULAR; INTRAVENOUS at 14:24

## 2022-11-30 RX ADMIN — OXYCODONE 10 MG: 5 TABLET ORAL at 21:41

## 2022-11-30 RX ADMIN — DOCUSATE SODIUM 100 MG: 100 CAPSULE, LIQUID FILLED ORAL at 21:41

## 2022-11-30 RX ADMIN — SODIUM CHLORIDE, POTASSIUM CHLORIDE, SODIUM LACTATE AND CALCIUM CHLORIDE: 600; 310; 30; 20 INJECTION, SOLUTION INTRAVENOUS at 19:41

## 2022-11-30 RX ADMIN — WATER 2000 MG: 1 INJECTION INTRAMUSCULAR; INTRAVENOUS; SUBCUTANEOUS at 23:53

## 2022-11-30 RX ADMIN — Medication 0.2 MG: at 14:28

## 2022-11-30 RX ADMIN — BUPIVACAINE HYDROCHLORIDE 1.6 ML: 7.5 INJECTION, SOLUTION SUBARACHNOID at 14:24

## 2022-11-30 ASSESSMENT — PAIN SCALES - GENERAL
PAINLEVEL_OUTOF10: 8
PAINLEVEL_OUTOF10: 7

## 2022-11-30 ASSESSMENT — PAIN DESCRIPTION - LOCATION: LOCATION: ABDOMEN

## 2022-11-30 ASSESSMENT — PAIN - FUNCTIONAL ASSESSMENT: PAIN_FUNCTIONAL_ASSESSMENT: PREVENTS OR INTERFERES SOME ACTIVE ACTIVITIES AND ADLS

## 2022-11-30 ASSESSMENT — PAIN DESCRIPTION - DESCRIPTORS: DESCRIPTORS: ACHING

## 2022-11-30 NOTE — ANESTHESIA PRE PROCEDURE
Department of Anesthesiology  Preprocedure Note       Name:  Abdi Davalos   Age:  33 y.o.  :  1990                                          MRN:  [id number]         Date:  2022      Surgeon: Nina Troncoso): Sarabjit Abad MD    Procedure: Procedure(s):   SECTION    Medications prior to admission:   Prior to Admission medications    Medication Sig Start Date End Date Taking? Authorizing Provider   glucose monitoring (FREESTYLE FREEDOM) kit 1 kit by Does not apply route daily 22   Sharon Patiño MD   glucose monitoring (FREESTYLE FREEDOM) kit 1 kit by Does not apply route daily 22   Sharon Patiño MD   Lancets MISC 1 each by Does not apply route 4 times daily 22   Sharon Patiño MD   blood glucose monitor strips Test 4 times a day & as needed for symptoms of irregular blood glucose. Dispense sufficient amount for indicated testing frequency plus additional to accommodate PRN testing needs.  22   Sharon Patiño MD   Prenatal Vit-Fe Fumarate-FA (PRENATAL VITAMINS PO) Take 1 tablet by mouth daily    Historical Provider, MD   metoprolol succinate (TOPROL XL) 25 MG extended release tablet Take 25 mg by mouth daily  Patient not taking: No sig reported    Historical Provider, MD   ACYCLOVIR PO Take 500 mg by mouth 2 times daily as needed During outbreak    Historical Provider, MD   metFORMIN (GLUCOPHAGE) 500 MG tablet Take 500 mg by mouth 2 times daily (with meals) Taking 500 mg lunch and dinner  Patient not taking: No sig reported    Historical Provider, MD       Current medications:    Current Facility-Administered Medications   Medication Dose Route Frequency Provider Last Rate Last Admin    lactated ringers infusion   IntraVENous Continuous Sarabjit Abad MD        lactated ringers bolus  1,000 mL IntraVENous Once Sarabjit Abad  mL/hr at 22 1351 1,000 mL at 22 1351    ceFAZolin (ANCEF) 2 g injection             sterile water injection Allergies: Allergies   Allergen Reactions    Bee Venom     Raspberry        Problem List:    Patient Active Problem List   Diagnosis Code    Type 2 diabetes mellitus complicating pregnancy, antepartum, second trimester O24.112    Essential hypertension I10    Habitual aborter, antepartum O26.20    Obesity complicating pregnancy, third trimester O99.213    Thyroid dysfunction, antepartum O99.280, E07.9    Hereditary disease in family possibly affecting fetus, affecting management of mother, antepartum condition or complication, single gestation O32. 2XX0    High risk pregnancy due to smoking in third trimester O99.333    Bipolar disease during pregnancy in second trimester ([facility] [address] H74.736, M74.9    Complicated pregnancy, third trimester O26.93    28 weeks gestation of pregnancy Z3A.28    Maternal care due to low transverse uterine scar from previous  delivery O34.211    45 weeks gestation of pregnancy Z3A.38       Past Medical History:        Diagnosis Date    Anxiety     ASD (atrial septal defect) 2021    - She has an ASD - No surgical repair   Recommend; Cardiology consultation echocardiogram    Asthma     Bipolar 1 disorder ([facility]      delivery delivered 2021    Chlamydia     Complication of pregnancy, third trimester 2021    Depression     Diabetes mellitus ([facility] [address]     FH: multiple miscarriages or stillbirths     Hypertension     Hypothyroidism     Maternal care due to low transverse uterine scar from previous  delivery 2021    Pre-eclampsia     Septal defect, atrial     Shingles     STD (sexually transmitted disease)     Thyroid disease     Type 2 diabetes mellitus without complication ([facility] [address]        Past Surgical History:        Procedure Laterality Date    ABDOMEN SURGERY       SECTION  2017     SECTION N/A 2021    REPEAT  SECTION performed by Elise Kidd MD at [facility] OR    DILATION AND Tish Cincinnatus, POCCL, POCBUN, POCHEMO, POCHCT in the last 72 hours. Coags: No results found for: PROTIME, INR, APTT    HCG (If Applicable):   Lab Results   Component Value Date    PREGTESTUR positive 04/26/2017        ABGs: No results found for: PHART, PO2ART, CQI0KNV, OUH9LEC, BEART, L4NDOPHE     Type & Screen (If Applicable):  No results found for: LABABO, LABRH    Drug/Infectious Status (If Applicable):  No results found for: HIV, HEPCAB    COVID-19 Screening (If Applicable):   Lab Results   Component Value Date/Time    COVID19 Not Detected 07/19/2021 11:15 AM           Anesthesia Evaluation   no history of anesthetic complications:   Airway: Mallampati: III  TM distance: >3 FB   Neck ROM: full  Mouth opening: > = 3 FB   Dental:          Pulmonary:normal exam  breath sounds clear to auscultation  (+) asthma:                            Cardiovascular:    (+) hypertension:, valvular problems/murmurs:,         Rhythm: regular  Rate: normal                 ROS comment: Septal defect     Neuro/Psych:   (+) psychiatric history:depression/anxiety             GI/Hepatic/Renal:   (+) GERD:,           Endo/Other:    (+) Diabetes, hypothyroidism::., .                 Abdominal:             Vascular: Other Findings:           Anesthesia Plan      spinal     ASA 3             Anesthetic plan and risks discussed with patient. Attending anesthesiologist reviewed and agrees with Preprocedure content                Buell Canavan, APRN - CRNA   11/30/2022      DOS STAFF ADDENDUM:    Pt seen and examined, physical exam updated, chart reviewed including anesthesia, drug and allergy history. H&P reviewed. No interval changes to history or physical examination (unless noted above). NPO status confirmed. Anesthetic plan, risks, benefits, alternatives discussed with patient. Patient verbalized an understanding and agrees to proceed.      Kia Hayward MD   Anesthesiologist

## 2022-11-30 NOTE — H&P
Department of Obstetrics and Gynecology  Labor and Delivery  History & Physical    Patient:  Joseph Moore     Admit Date:  2022 12:57 PM  Medical Record Number:  [id number]    CHIEF COMPLAINT:  abdominal pain, ctx, previous c.s    PROBLEM LIST:     Patient Active Problem List   Diagnosis    Type 2 diabetes mellitus complicating pregnancy, antepartum, second trimester    Essential hypertension    Habitual aborter, antepartum    Obesity complicating pregnancy, third trimester    Thyroid dysfunction, antepartum    Hereditary disease in family possibly affecting fetus, affecting management of mother, antepartum condition or complication, single gestation    High risk pregnancy due to smoking in third trimester    Bipolar disease during pregnancy in second trimester ([facility] [address]    Complicated pregnancy, third trimester    28 weeks gestation of pregnancy    Maternal care due to low transverse uterine scar from previous  delivery    38 weeks gestation of pregnancy           HISTORY OF PRESENT ILLNESS:    The patient is a 33 y.o.  female  at 44w7d.   Patient presents with a chief complaint as above   ESTIMATED DUE DATE: Estimated Date of Delivery: 22    PRENATAL CARE:  Complicated by: chronic hypertension and diabetes mellitus type 2, habitual aborter       Past OB History  OB History          13    Para   3    Term   3       0    AB   9    Living   2         SAB   9    IAB   0    Ectopic   0    Molar   0    Multiple   0    Live Births   2          Obstetric Comments   9 SAB               Past Medical History:        Diagnosis Date    Anxiety     ASD (atrial septal defect) 2021    - She has an ASD - No surgical repair   Recommend; Cardiology consultation echocardiogram    Asthma     Bipolar 1 disorder ([facility] [address]      delivery delivered 2021    Chlamydia     Complication of pregnancy, third trimester 2021    Depression     Diabetes mellitus ([facility] [address]     FH: multiple miscarriages or stillbirths     Hypertension     Hypothyroidism     Maternal care due to low transverse uterine scar from previous  delivery 2021    Pre-eclampsia     Septal defect, atrial     Shingles     STD (sexually transmitted disease)     Thyroid disease     Type [facility]        Past Surgical History:        Procedure Laterality Date    ABDOMEN SURGERY       SECTION  2017     SECTION N/A 2021    REPEAT  SECTION performed by Elie Hawkins MD at [facility] OR    DILATION AND CURETTAGE      DILATION AND CURETTAGE OF UTERUS      ENDOSCOPY, COLON, DIAGNOSTIC         Allergies:  Bee venom and Raspberry    Social History:    Social History     Socioeconomic History    Marital status: Single     Spouse name: Not on file    Number of children: Not on file    Years of education: Not on file    Highest education level: Not on file   Occupational History    Not on file   Tobacco Use    Smoking status: Every Day     Packs/day: 0.50     Years: 6.00     Pack years: 3.00     Types: Cigarettes    Smokeless tobacco: Never   Vaping Use    Vaping Use: Never used   Substance and Sexual Activity    Alcohol use: Not Currently     Comment: occasionally    Drug use: Not Currently     Frequency: 1.0 times per week     Types: Marijuana [place])    Sexual activity: Yes     Partners: Male   Other Topics Concern    Not on file   Social History Narrative    Not on file     Social Determinants of Health     Financial Resource Strain: Not on file   Food Insecurity: Not on file   Transportation Needs: Not on file   Physical Activity: Not on file   Stress: Not on file   Social Connections: Not on file   Intimate Partner Violence: Not on file   Housing Stability: Not on file       Family History:       Problem Relation Age of Onset    Heart Disease Mother     Diabetes Mother     Stroke Mother     Heart Disease Maternal Grandfather        Medications Prior to Admission:  Medications Prior to Admission: glucose monitoring (FREESTYLE FREEDOM) kit, 1 kit by Does not apply route daily  glucose monitoring (FREESTYLE FREEDOM) kit, 1 kit by Does not apply route daily  Lancets MISC, 1 each by Does not apply route 4 times daily  blood glucose monitor strips, Test 4 times a day & as needed for symptoms of irregular blood glucose. Dispense sufficient amount for indicated testing frequency plus additional to accommodate PRN testing needs. Prenatal Vit-Fe Fumarate-FA (PRENATAL VITAMINS PO), Take 1 tablet by mouth daily  metoprolol succinate (TOPROL XL) 25 MG extended release tablet, Take 25 mg by mouth daily (Patient not taking: Reported on 9/19/2022)  ACYCLOVIR PO, Take 500 mg by mouth 2 times daily as needed  (Patient not taking: No sig reported)  metFORMIN (GLUCOPHAGE) 500 MG tablet, Take 500 mg by mouth 2 times daily (with meals) Taking 500 mg lunch and dinner (Patient not taking: Reported on 9/19/2022)    REVIEW OF SYSTEMS:  CONSTITUTIONAL:  negative  RESPIRATORY:  negative  CARDIOVASCULAR:  negative  GASTROINTESTINAL:  negative  ALLERGIC/IMMUNOLOGIC:  negative  NEUROLOGICAL:  negative  BEHAVIOR/PSYCH:  negative    PHYSICAL EXAM:  Vitals:    11/30/22 1305   BP: 136/81   Pulse: 93     General appearance:  awake, alert, cooperative, no apparent distress, and appears stated age  Neurologic:  Awake, alert, oriented to name, place and time. Skin: warm, dry, normal color, no rashes  Head:  NC,AT,NT  Eyes:  Sclerae white, pupils equal and reactive, red reflex normal bilaterally  Ears:  Well-positioned, well-formed pinnae; Hearing: intact  Nose:  Clear, normal mucosa  Throat:  Lips, tongue and mucosa are pink, moist and intact; no exudate  Neck:  Supple, symmetrical  Heart:  Regular rate & rhythm, S1 S2, no murmurs, rubs, or gallops  Lungs:  No increased work of breathing, good air exchange, CTA b/l.   Abdomen:  Soft, non tender, gravid, consistent with her gestational age  Extremities: No clubbing, cyanosis, cords; No calf tenderness; Edema: no  Pulses:  Strong equal distal pulses, brisk capillary refill  Fetal heart rate:  Reassuring. Baseline 155  Pelvis:  Adequate pelvis  Cervix: 1-2cm  (dmitriy marin)  Contraction frequency:  4 minutes  Membranes:  Intact    Labs:  No results found for this or any previous visit (from the past 72 hour(s)).     ASSESSMENT:  33 y.o.  female at 44w7d [id number]  Term pregnancy essential htn, type 2 dm on metformin  [place], previous c./s   Patient Active Problem List   Diagnosis    Type 2 diabetes mellitus complicating pregnancy, antepartum, second trimester    Essential hypertension    Habitual aborter, antepartum    Obesity complicating pregnancy, third trimester    Thyroid dysfunction, antepartum    Hereditary disease in family possibly affecting fetus, affecting management of mother, antepartum condition or complication, single gestation    High risk pregnancy due to smoking in third trimester    Bipolar disease during pregnancy in second trimester ([facility] [address]    Complicated pregnancy, third trimester    28 weeks gestation of pregnancy    Maternal care due to low transverse uterine scar from previous  delivery    38 weeks gestation of pregnancy     Fetus: Reassuring  GBS: not done  Hepatitis c+  Rubella-immune  Rpr-nr  Blood type-o+    PLAN:  , routine pre op orders  Fingerstick glucose preop      SHASHANK Traore - CANELO,   2022 1:11 PM

## 2022-11-30 NOTE — ANESTHESIA PROCEDURE NOTES
Spinal Block    Patient location during procedure: OB  End time: 11/30/2022 2:30 PM  Reason for block: primary anesthetic and at surgeon's request  Staffing  Performed: resident/CRNA   Anesthesiologist: Colleen Sapp MD  Resident/CRNA: SHASHANK Tinajero CRNA  Spinal Block  Patient position: sitting  Prep: Betadine  Patient monitoring: cardiac monitor, continuous pulse ox, continuous capnometry and frequent blood pressure checks  Approach: midline  Location: L3/L4  Provider prep: mask, sterile gloves and sterile gown  Local infiltration: lidocaine  Needle  Needle type: Pencan   Needle gauge: 25 G  Needle length: 3.5 in  Assessment  Sensory level: T4  Swirl obtained: Yes  CSF: clear  Attempts: 1  Hemodynamics: stable  Preanesthetic Checklist  Completed: patient identified, IV checked, site marked, risks and benefits discussed, surgical/procedural consents, equipment checked, pre-op evaluation, timeout performed, anesthesia consent given, oxygen available and monitors applied/VS acknowledged

## 2022-11-30 NOTE — FLOWSHEET NOTE
Admitted to 309. Instructed on infant safety  and safe sleep protocols,clear liquid diet, bedrest,and coughing and deep breathing.

## 2022-11-30 NOTE — OP NOTE
Operative Note      Patient: Joseph Moore  YOB: 1990  MRN: [id number]    Date of Procedure: 2022    Pre-Op Diagnosis: IUP @ 45 5/7 weeks                                 Previous  section x 2                                 Labor                                 Hepatitis C    Post-Op Diagnosis: Same       Procedure:  Repeat low-transverse  section    Surgeon(s): Luzma Hartman MD    Assistant:   Gatito Edouard PA-C whose presence was necessary throughout the procedure for exposure and delivery in the absence of a qualified resident. Anesthesia: Spinal    Estimated Blood Loss (mL): 438YG    Complications: None      Drains: Guillermo    Findings: 7lb. 13 oz., 3530 gram female infant, Apgars 8/9 born @ 46 in OP position. Thick meconium fluid, meconium stained placenta and 3 VC. Nl uterus, tubes and ovaries bilaterally.     Condition:  Stable    Detailed Description of Procedure:   [id number]    Electronically signed by Luzma Hartman MD on 2022 at 3:00 PM

## 2022-12-01 LAB
HCT VFR BLD CALC: 34.2 % (ref 34–48)
HEMOGLOBIN: 11.2 G/DL (ref 11.5–15.5)
RPR: NORMAL

## 2022-12-01 PROCEDURE — 6370000000 HC RX 637 (ALT 250 FOR IP): Performed by: OBSTETRICS & GYNECOLOGY

## 2022-12-01 PROCEDURE — 90471 IMMUNIZATION ADMIN: CPT | Performed by: OBSTETRICS & GYNECOLOGY

## 2022-12-01 PROCEDURE — 6360000002 HC RX W HCPCS: Performed by: ANESTHESIOLOGY

## 2022-12-01 PROCEDURE — 36415 COLL VENOUS BLD VENIPUNCTURE: CPT

## 2022-12-01 PROCEDURE — 90715 TDAP VACCINE 7 YRS/> IM: CPT | Performed by: OBSTETRICS & GYNECOLOGY

## 2022-12-01 PROCEDURE — 6360000002 HC RX W HCPCS: Performed by: OBSTETRICS & GYNECOLOGY

## 2022-12-01 PROCEDURE — 1220000000 HC SEMI PRIVATE OB R&B

## 2022-12-01 PROCEDURE — 2580000003 HC RX 258: Performed by: OBSTETRICS & GYNECOLOGY

## 2022-12-01 PROCEDURE — 6370000000 HC RX 637 (ALT 250 FOR IP): Performed by: ANESTHESIOLOGY

## 2022-12-01 PROCEDURE — 85014 HEMATOCRIT: CPT

## 2022-12-01 PROCEDURE — 85018 HEMOGLOBIN: CPT

## 2022-12-01 RX ORDER — METOPROLOL SUCCINATE 25 MG/1
25 TABLET, EXTENDED RELEASE ORAL DAILY
Status: DISCONTINUED | OUTPATIENT
Start: 2022-12-01 | End: 2022-12-03 | Stop reason: HOSPADM

## 2022-12-01 RX ADMIN — SODIUM CHLORIDE, PRESERVATIVE FREE 10 ML: 5 INJECTION INTRAVENOUS at 08:38

## 2022-12-01 RX ADMIN — DOCUSATE SODIUM 100 MG: 100 CAPSULE, LIQUID FILLED ORAL at 08:39

## 2022-12-01 RX ADMIN — SIMETHICONE 80 MG: 80 TABLET, CHEWABLE ORAL at 21:21

## 2022-12-01 RX ADMIN — OXYCODONE 10 MG: 5 TABLET ORAL at 20:16

## 2022-12-01 RX ADMIN — SIMETHICONE 80 MG: 80 TABLET, CHEWABLE ORAL at 08:39

## 2022-12-01 RX ADMIN — DIPHENHYDRAMINE HCL 25 MG: 25 TABLET ORAL at 08:38

## 2022-12-01 RX ADMIN — METOPROLOL SUCCINATE 25 MG: 25 TABLET, FILM COATED, EXTENDED RELEASE ORAL at 11:26

## 2022-12-01 RX ADMIN — SODIUM CHLORIDE, PRESERVATIVE FREE 10 ML: 5 INJECTION INTRAVENOUS at 00:17

## 2022-12-01 RX ADMIN — SODIUM CHLORIDE, PRESERVATIVE FREE 10 ML: 5 INJECTION INTRAVENOUS at 00:10

## 2022-12-01 RX ADMIN — SODIUM CHLORIDE, PRESERVATIVE FREE 10 ML: 5 INJECTION INTRAVENOUS at 03:21

## 2022-12-01 RX ADMIN — KETOROLAC TROMETHAMINE 15 MG: 30 INJECTION, SOLUTION INTRAMUSCULAR at 03:19

## 2022-12-01 RX ADMIN — SIMETHICONE 80 MG: 80 TABLET, CHEWABLE ORAL at 11:27

## 2022-12-01 RX ADMIN — METFORMIN HYDROCHLORIDE 1 TABLET: 500 TABLET, EXTENDED RELEASE ORAL at 08:39

## 2022-12-01 RX ADMIN — DOCUSATE SODIUM 100 MG: 100 CAPSULE, LIQUID FILLED ORAL at 21:21

## 2022-12-01 RX ADMIN — TETANUS TOXOID, REDUCED DIPHTHERIA TOXOID AND ACELLULAR PERTUSSIS VACCINE, ADSORBED 0.5 ML: 5; 2.5; 8; 8; 2.5 SUSPENSION INTRAMUSCULAR at 23:54

## 2022-12-01 RX ADMIN — OXYCODONE 5 MG: 5 TABLET ORAL at 08:39

## 2022-12-01 ASSESSMENT — PAIN SCALES - GENERAL
PAINLEVEL_OUTOF10: 6
PAINLEVEL_OUTOF10: 8
PAINLEVEL_OUTOF10: 7

## 2022-12-01 ASSESSMENT — PAIN - FUNCTIONAL ASSESSMENT
PAIN_FUNCTIONAL_ASSESSMENT: ACTIVITIES ARE NOT PREVENTED
PAIN_FUNCTIONAL_ASSESSMENT: ACTIVITIES ARE NOT PREVENTED

## 2022-12-01 ASSESSMENT — PAIN DESCRIPTION - ORIENTATION
ORIENTATION: LOWER
ORIENTATION: LOWER

## 2022-12-01 ASSESSMENT — PAIN DESCRIPTION - LOCATION
LOCATION: ABDOMEN
LOCATION: ABDOMEN

## 2022-12-01 ASSESSMENT — PAIN DESCRIPTION - DESCRIPTORS
DESCRIPTORS: SORE
DESCRIPTORS: TENDER;DISCOMFORT;PRESSURE

## 2022-12-01 NOTE — FLOWSHEET NOTE
Guillermo catheter discontinued. Drianed 500cc clear yellow urine. PT DTV. [id number]. Pt dangled briefly at bedside then assisted to bathroom for edilia care. Edilia care done, pt instructed on edilia care. Pt up and about in room. Pt will get back to bed on her own. Pt to call for assist to bathroom with first void if needed. hard copy, drawn during this pregnancy

## 2022-12-01 NOTE — PLAN OF CARE
Problem: Vaginal Birth or  Section  Goal: Fetal and maternal status remain reassuring during the birth process  Description:  Birth OB-Pregnancy care plan goal which identifies if the fetal and maternal status remain reassuring during the birth process  Outcome: Completed     Problem: Postpartum  Goal: Experiences normal postpartum course  Description:  Postpartum OB-Pregnancy care plan goal which identifies if the mother is experiencing a normal postpartum course  Outcome: Progressing  Goal: Appropriate maternal -  bonding  Description:  Postpartum OB-Pregnancy care plan goal which identifies if the mother and  are bonding appropriately  Outcome: Progressing  Goal: Establishment of infant feeding pattern  Description:  Postpartum OB-Pregnancy care plan goal which identifies if the mother is establishing a feeding pattern with their   Outcome: Progressing  Goal: Incisions, wounds, or drain sites healing without S/S of infection  Outcome: Progressing  Flowsheets (Taken 2022 by Qasim Schmitt RN)  Incisions, Wounds, or Drain Sites Healing Without Sign and Symptoms of Infection: TWICE DAILY: Assess and document skin integrity     Problem: Pain  Goal: Verbalizes/displays adequate comfort level or baseline comfort level  Outcome: Progressing  Flowsheets (Taken 2022 by Qasim Schmitt RN)  Verbalizes/displays adequate comfort level or baseline comfort level:   Assess pain using appropriate pain scale   Encourage patient to monitor pain and request assistance     Problem: Infection - Adult  Goal: Absence of infection at discharge  Outcome: Progressing  Flowsheets (Taken 2022 by Qasim Schmitt RN)  Absence of infection at discharge: Assess and monitor for signs and symptoms of infection  Goal: Absence of infection during hospitalization  Outcome: Progressing  Flowsheets (Taken 2022 by Qasim Schmitt RN)  Absence of infection during hospitalization: Assess and monitor for signs and symptoms of infection  Goal: Absence of fever/infection during anticipated neutropenic period  Outcome: Progressing  Flowsheets (Taken 11/30/2022 1807 by Branden Saini RN)  Absence of fever/infection during anticipated neutropenic period: Monitor white blood cell count     Problem: Safety - Adult  Goal: Free from fall injury  Outcome: Progressing     Problem: Discharge Planning  Goal: Discharge to home or other facility with appropriate resources  Outcome: Progressing     Problem: Chronic Conditions and Co-morbidities  Goal: Patient's chronic conditions and co-morbidity symptoms are monitored and maintained or improved  Outcome: Progressing

## 2022-12-01 NOTE — CARE COORDINATION
SW Discharge Planning   SW received consult for \" marijuana use per patient\"    MARLON met privately with Mercedes De Paz ( [phone]) mother to baby girl Jose Saini (11/30/22) and introduced self and role. Songsandy Michelle reported that she resides at the address listed in the chart with the father of the baby, Kayli Webster ( 12/13/85) and her son, Emani Manzo ( 7/20/21). Galina reported that she does not have custody of her other children due to past history of cocaine. Ishaan Martinez stated that she is currently unemployed and will be adding baby to her [employer]. Per Ishaan Martinez, prenatal care was with Dr. Rossi Shane, and pediatric care will be with [address] [place] [address]. Galina reported having all items aside from a car seat. SW provided [address] expressed understanding that a carseat will be needed prior to discharge. Galina declined [facility] and WIC. Galina  denied any past or current history of legal issues or  domestic violence. Galina did report that children services was involved in the past for substance abuse, and also stated that she did use THC occasionally though pregnancy. Galina also reported having anxiety and bipolar,  We discussed awareness of Post Partum Depression and encouraged contact with her OB if any problems arise.   Galina expressed understanding for the need of a [facility] ( [phone]) referral.     SW complete [facility] ( [phone]) referral to [place] Nurse in intake     PLAN    Baby can NOT be discharged home until [facility] ( [phone]) provides disposition  SW to continue communication with nursing staff and [facility] ( [phone])      Electronically signed by KELVIN Butler on 12/1/2022 at 10:54 AM

## 2022-12-01 NOTE — ANESTHESIA POSTPROCEDURE EVALUATION
Department of Anesthesiology  Postprocedure Note    Patient: Shania Herndon  MRN: [id number]  [place]: 1990  Date of evaluation: 2022      Procedure Summary     Date: 22 Room / Location: [facility] OR 01 / [facility]    Anesthesia Start: 682 Anesthesia Stop:     Procedure:  SECTION (Uterus) Diagnosis: Previous  section    Surgeons: Lena Fletcher MD Responsible Provider: Crista Paul MD    Anesthesia Type: Spinal ASA Status: 3          Anesthesia Type: Spinal    Jose Phase I: Jose Score: 9    Jose Phase II:        Anesthesia Post Evaluation    Patient location during evaluation: floor  Patient participation: complete - patient participated  Level of consciousness: awake and alert  Airway patency: patent  Nausea & Vomiting: no nausea and no vomiting  Complications: no  Cardiovascular status: blood pressure returned to baseline  Respiratory status: acceptable and room air  Hydration status: euvolemic

## 2022-12-01 NOTE — OP NOTE
[address] [address]                                OPERATIVE REPORT    PATIENT NAME: Vanita Zaldivar              :        1990  MED REC NO:   [id number]                            ROOM:       0309  ACCOUNT NO:   [de-identified]                           ADMIT DATE: 2022  PROVIDER:     Shai High MD    DATE OF PROCEDURE:  2022    PREOPERATIVE DIAGNOSES:  Intrauterine pregnancy at 45 and 5/7th weeks,  previous  x2, labor, and hepatitis C. POSTOPERATIVE DIAGNOSES:  Intrauterine pregnancy at 45 and 5/7th weeks,  previous  x2, labor, and hepatitis C. PROCEDURE PERFORMED:  Repeat low transverse  section. SURGEON:  Shai High MD.    FIRST ASSISTANT:  Prabha Vazquez PA-C.  Prabha Vazquez PA-C, whose  presence was necessary throughout the procedure for exposure and  delivery in the absence of a qualified resident. ANESTHESIA:  Spinal.    FLUIDS:  IV crystalloid. COMPLICATIONS:  None. ESTIMATED BLOOD LOSS:  650 mL. DRAINS:  Guillermo. FINDINGS:  A 7-pound 13-ounce, 3530 gm female infant, Apgars of 8 and 9,  was born at 1436 hours in the occiput posterior position. There was  thick meconium fluid, meconium-stained placenta, and three-vessel cord. Normal uterus, tubes, and ovaries bilaterally. CONDITION:  Stable. DESCRIPTION OF PROCEDURE:  The patient was brought to the main OR. She  was then prepped and draped in the usual fashion in the dorsal supine  position. After administration of spinal anesthetic, a prior  Pfannenstiel skin incision was used and carried through the subcutaneous  tissue to the anterior rectus sheath, which was incised bilaterally and  undermined using Bovie cautery. The rectus muscles were split in the  midline. The peritoneal cavity was sharply entered. An extra large  Nathaniel retractor was placed.   The bladder flap was created. A  transverse incision was made in the lower uterine segment and extended  in a curvilinear fashion. The amnion was ruptured for thick pea soup  type meconium. The infant was in the occiput posterior position and was  delivered as a 7-pound 13-ounce, 3530 gm female, Apgars of 8 and 9, born  at 1436 hours. The baby's yuniel and nasopharynx were immediately  suctioned. Cord was doubly clamped and ligated after a delay. The  infant was handed off to the nurse present in the operative delivery  suite. Cord gases were obtained. Cord blood was obtained. The  placenta was manually extracted. The uterus was exteriorized, wiped  free of clot and debris, inspected, re-peritonealized and closed in a  single layer of running interlocking 0 Vicryl suture with blunt needles  with two separate sutures covering the entire incision. The cul-de-sac  and paracolic gutters were wiped free of clot and debris. The rectus  muscles were reapproximated in the midline with the 0 Vicryl suture as  well with loose tie. The fascia was then reapproximated with two  separate sutures of 0 Vicryl on the blunt needles in a running  interlocking fashion to the midpoint, where a second suture was started  and carried through the right apex. Subcutaneous tissue was lysed to  free up the retracted skin edges and cauterized. Irrigation was  performed. The tissue was then reapproximated with interrupted sutures  of 2-0 Vicryl and Insorb stapling device. All sponge, lap, needle, and  instrument counts were correct and the patient was transported to  Recovery in stable condition.         Jim Brito MD    D: 11/30/2022 15:08:54       T: 11/30/2022 15:12:09     [email or web address]  Job#: [id number]     Doc#: [id number]    CC:

## 2022-12-01 NOTE — PROGRESS NOTES
Subjective:    Patient without complaints. Normal lochia. Tolerating PO.   Bowel movements: No  Flatus: No  Ambulating: Yes    Objective:  /83   Pulse 73   Temp 98 °F (36.7 °C) (Oral)   Resp 16   LMP 03/04/2022   SpO2 99%   Breastfeeding Unknown   Lungs:  CTA   Cardiac:  Regular rhythm  Abdomen:  Uterus firm, non-tender, incision covered with mepilex, +bs  Extremities:  No calf pain    Lab Results   Component Value Date    HGB 11.2 (L) 12/01/2022        Assessment:  Post-operative day # 1  Repeat c/s at 38 wks  Hepatitis c    Plan: Continue current care

## 2022-12-01 NOTE — CARE COORDINATION
SW Discharge Planning     Per [facility] ( [phone]) supervisor, Candelaria De Anda, [facility] ( [phone]) will NOT be involved at this time     PLAN    Baby CAN be discharged home when medically ready, children services will NOT be involved at this time.       Electronically signed by KELVIN Vizcarra on 12/1/2022 at 1:08 PM

## 2022-12-01 NOTE — PROGRESS NOTES
[facility] [place] [facility] screening results were discussed with parent. Questions answered. Brochure given to parent. Advised to monitor developmental milestones and contact physician for any concerns.    Marcial Katz

## 2022-12-02 LAB
C. TRACHOMATIS DNA ,URINE: NEGATIVE
N. GONORRHOEAE DNA, URINE: NEGATIVE
SOURCE: NORMAL

## 2022-12-02 PROCEDURE — 99232 SBSQ HOSP IP/OBS MODERATE 35: CPT | Performed by: NURSE PRACTITIONER

## 2022-12-02 PROCEDURE — 1220000000 HC SEMI PRIVATE OB R&B

## 2022-12-02 PROCEDURE — 6370000000 HC RX 637 (ALT 250 FOR IP): Performed by: OBSTETRICS & GYNECOLOGY

## 2022-12-02 RX ADMIN — METFORMIN HYDROCHLORIDE 1 TABLET: 500 TABLET, EXTENDED RELEASE ORAL at 09:30

## 2022-12-02 RX ADMIN — SIMETHICONE 80 MG: 80 TABLET, CHEWABLE ORAL at 08:24

## 2022-12-02 RX ADMIN — DOCUSATE SODIUM 100 MG: 100 CAPSULE, LIQUID FILLED ORAL at 09:30

## 2022-12-02 RX ADMIN — OXYCODONE 5 MG: 5 TABLET ORAL at 20:57

## 2022-12-02 RX ADMIN — OXYCODONE 10 MG: 5 TABLET ORAL at 12:43

## 2022-12-02 RX ADMIN — SIMETHICONE 80 MG: 80 TABLET, CHEWABLE ORAL at 20:57

## 2022-12-02 RX ADMIN — SIMETHICONE 80 MG: 80 TABLET, CHEWABLE ORAL at 17:02

## 2022-12-02 RX ADMIN — SIMETHICONE 80 MG: 80 TABLET, CHEWABLE ORAL at 12:54

## 2022-12-02 RX ADMIN — DOCUSATE SODIUM 100 MG: 100 CAPSULE, LIQUID FILLED ORAL at 20:57

## 2022-12-02 RX ADMIN — OXYCODONE 10 MG: 5 TABLET ORAL at 04:44

## 2022-12-02 RX ADMIN — IBUPROFEN 800 MG: 800 TABLET, FILM COATED ORAL at 08:25

## 2022-12-02 RX ADMIN — IBUPROFEN 800 MG: 800 TABLET, FILM COATED ORAL at 17:02

## 2022-12-02 RX ADMIN — METOPROLOL SUCCINATE 25 MG: 25 TABLET, FILM COATED, EXTENDED RELEASE ORAL at 09:31

## 2022-12-02 ASSESSMENT — PAIN DESCRIPTION - ORIENTATION
ORIENTATION: LOWER
ORIENTATION: ANTERIOR;LOWER
ORIENTATION: LOWER
ORIENTATION: ANTERIOR;LOWER

## 2022-12-02 ASSESSMENT — PAIN - FUNCTIONAL ASSESSMENT
PAIN_FUNCTIONAL_ASSESSMENT: PREVENTS OR INTERFERES SOME ACTIVE ACTIVITIES AND ADLS
PAIN_FUNCTIONAL_ASSESSMENT: ACTIVITIES ARE NOT PREVENTED
PAIN_FUNCTIONAL_ASSESSMENT: ACTIVITIES ARE NOT PREVENTED

## 2022-12-02 ASSESSMENT — PAIN DESCRIPTION - LOCATION
LOCATION: ABDOMEN
LOCATION: ABDOMEN;INCISION

## 2022-12-02 ASSESSMENT — PAIN SCALES - GENERAL
PAINLEVEL_OUTOF10: 9
PAINLEVEL_OUTOF10: 10
PAINLEVEL_OUTOF10: 8

## 2022-12-02 ASSESSMENT — PAIN DESCRIPTION - DESCRIPTORS
DESCRIPTORS: SORE
DESCRIPTORS: OTHER (COMMENT)
DESCRIPTORS: SORE

## 2022-12-02 NOTE — PLAN OF CARE
Problem: Postpartum  Goal: Experiences normal postpartum course  Outcome: Progressing  Goal: Appropriate maternal -  bonding  Outcome: Progressing  Goal: Establishment of infant feeding pattern  Outcome: Progressing  Goal: Incisions, wounds, or drain sites healing without S/S of infection  Outcome: Progressing  Flowsheets (Taken 20220)  Incisions, Wounds, or Drain Sites Healing Without Sign and Symptoms of Infection: TWICE DAILY: Assess and document dressing/incision, wound bed, drain sites and surrounding tissue     Problem: Pain  Goal: Verbalizes/displays adequate comfort level or baseline comfort level  Outcome: Progressing  Flowsheets (Taken 2022)  Verbalizes/displays adequate comfort level or baseline comfort level:   Encourage patient to monitor pain and request assistance   Assess pain using appropriate pain scale   Administer analgesics based on type and severity of pain and evaluate response   Implement non-pharmacological measures as appropriate and evaluate response     Problem: Infection - Adult  Goal: Absence of infection at discharge  Outcome: Progressing  Goal: Absence of infection during hospitalization  Outcome: Progressing  Goal: Absence of fever/infection during anticipated neutropenic period  Outcome: Progressing     Problem: Safety - Adult  Goal: Free from fall injury  Outcome: Progressing     Problem: Discharge Planning  Goal: Discharge to home or other facility with appropriate resources  Outcome: Progressing     Problem: Chronic Conditions and Co-morbidities  Goal: Patient's chronic conditions and co-morbidity symptoms are monitored and maintained or improved  Outcome: Progressing

## 2022-12-02 NOTE — PROGRESS NOTES
POD #2    Patient:  Cathy Tillman     Admit Date:  11/30/2022 12:57 PM  Medical Record Number:  [id number]   Today's Date: 12/2/2022    S: No complaints; tolerating diet: affirms; ambulating well: affirms; voiding without difficulty:  affirms; bm: affirms; flatus: affirms; pain meds appropriate: affirms; vaginal bleeding: thin lochia.     O:   Vitals:    12/01/22 2329 12/01/22 2345 12/02/22 0444 12/02/22 0747   BP: (!) 161/78 (!) 152/86  135/72   Pulse: 85   90   Resp: 16  18 18   Temp: 98 °F (36.7 °C)   98.6 °F (37 °C)   TempSrc: Oral   Oral   SpO2: 98%   95%     Gen: A&O, cooperative, pleasant   Heart: RRR   Lungs: CTA b/l   Abd; soft, NT, non distended, +BS  Back: no CVA or paraspinal tenderness   Ext: No clubbing, cyanosis, edema:no , no cords palpable, no calf tenderness   Neuro: intact   Inc: clean, dry, intact, mepilex dressing  Uterus: firm, well contracted, nt   Mckenna pad: thin lochia    Recent Results (from the past 72 hour(s))   TYPE AND SCREEN    Collection Time: 11/30/22  1:10 PM   Result Value Ref Range    ABO/Rh O POS     Antibody Screen NEG    CBC    Collection Time: 11/30/22  1:10 PM   Result Value Ref Range    WBC 10.8 4.5 - 11.5 E9/L    RBC 4.26 3.50 - 5.50 E12/L    Hemoglobin 13.5 11.5 - 15.5 g/dL    Hematocrit 40.1 34.0 - 48.0 %    MCV 94.1 80.0 - 99.9 fL    MCH 31.7 26.0 - 35.0 pg    MCHC 33.7 32.0 - 34.5 %    RDW 14.4 11.5 - 15.0 fL    Platelets 664 374 - 758 E9/L    MPV 11.2 7.0 - 12.0 fL   C.trachomatis N.gonorrhoeae DNA, Urine    Collection Time: 11/30/22  1:27 PM    Specimen: Urine   Result Value Ref Range    Source Urine    URINE DRUG SCREEN    Collection Time: 11/30/22  1:30 PM   Result Value Ref Range    Amphetamine Screen, Urine NOT DETECTED Negative <1000 ng/mL    Barbiturate Screen, Ur NOT DETECTED Negative < 200 ng/mL    Benzodiazepine Screen, Urine NOT DETECTED Negative < 200 ng/mL    Cannabinoid Scrn, Ur NOT DETECTED Negative < 50ng/mL    Cocaine Metabolite Screen, Urine NOT DETECTED Negative < 300 ng/mL    Opiate Scrn, Ur NOT DETECTED Negative < 300ng/mL    PCP Screen, Urine NOT DETECTED Negative < 25 ng/mL    Methadone Screen, Urine NOT DETECTED Negative <300 ng/mL    Oxycodone Urine NOT DETECTED Negative <100 ng/mL    FENTANYL SCREEN, URINE NOT DETECTED Negative <1 ng/mL    Drug Screen Comment: see below    RPR    Collection Time: 22  1:30 PM   Result Value Ref Range    RPR NON-REACTIVE Non-reactive   Hemoglobin and Hematocrit    Collection Time: 22  5:16 AM   Result Value Ref Range    Hemoglobin 11.2 (L) 11.5 - 15.5 g/dL    Hematocrit 34.2 34.0 - 48.0 %       A: 33 y.o. female [id number] at 38w5d  POD#2 S/P repeat low transverse  section   Patient Active Problem List   Diagnosis    Type 2 diabetes mellitus affecting pregnancy in third trimester, antepartum    Essential hypertension    Habitual aborter, antepartum    Obesity complicating pregnancy, third trimester    Thyroid dysfunction, antepartum    Hereditary disease in family possibly affecting fetus, affecting management of mother, antepartum condition or complication, single gestation    High risk pregnancy due to smoking in third trimester    Bipolar disease during pregnancy in second trimester ([facility] [address]    Complicated pregnancy, third trimester    28 weeks gestation of pregnancy    Maternal care due to low transverse uterine scar from previous  delivery    38 weeks gestation of pregnancy    Previous  section complicating pregnancy, antepartum condition or complication    Chronic hepatitis C affecting antepartum care of mother in third trimester [facility])    Previous  delivery affecting pregnancy       P: Routine post-op care. Encourage advancements in diet and activity. Continue medications for pain control  Family member bringing car seat this afternoon  Anticipate home tomorrow.     SHASHANK Kidd - CNP    2022 8:35 AM

## 2022-12-03 VITALS
DIASTOLIC BLOOD PRESSURE: 76 MMHG | RESPIRATION RATE: 18 BRPM | SYSTOLIC BLOOD PRESSURE: 117 MMHG | OXYGEN SATURATION: 96 % | TEMPERATURE: 98.6 F | HEART RATE: 97 BPM

## 2022-12-03 PROCEDURE — 6370000000 HC RX 637 (ALT 250 FOR IP): Performed by: OBSTETRICS & GYNECOLOGY

## 2022-12-03 RX ORDER — OXYCODONE HYDROCHLORIDE 5 MG/1
5 TABLET ORAL EVERY 6 HOURS PRN
Qty: 16 TABLET | Refills: 0 | Status: SHIPPED | OUTPATIENT
Start: 2022-12-03 | End: 2022-12-08

## 2022-12-03 RX ORDER — IBUPROFEN 800 MG/1
800 TABLET ORAL EVERY 8 HOURS PRN
Qty: 18 TABLET | Refills: 0 | Status: SHIPPED | OUTPATIENT
Start: 2022-12-03

## 2022-12-03 RX ADMIN — METFORMIN HYDROCHLORIDE 1 TABLET: 500 TABLET, EXTENDED RELEASE ORAL at 08:58

## 2022-12-03 RX ADMIN — SIMETHICONE 80 MG: 80 TABLET, CHEWABLE ORAL at 20:43

## 2022-12-03 RX ADMIN — METOPROLOL SUCCINATE 25 MG: 25 TABLET, FILM COATED, EXTENDED RELEASE ORAL at 08:57

## 2022-12-03 RX ADMIN — SIMETHICONE 80 MG: 80 TABLET, CHEWABLE ORAL at 08:58

## 2022-12-03 RX ADMIN — DOCUSATE SODIUM 100 MG: 100 CAPSULE, LIQUID FILLED ORAL at 08:57

## 2022-12-03 RX ADMIN — OXYCODONE 10 MG: 5 TABLET ORAL at 20:43

## 2022-12-03 RX ADMIN — OXYCODONE 10 MG: 5 TABLET ORAL at 16:14

## 2022-12-03 RX ADMIN — OXYCODONE 10 MG: 5 TABLET ORAL at 09:22

## 2022-12-03 RX ADMIN — IBUPROFEN 800 MG: 800 TABLET, FILM COATED ORAL at 01:28

## 2022-12-03 RX ADMIN — OXYCODONE 10 MG: 5 TABLET ORAL at 03:13

## 2022-12-03 RX ADMIN — DOCUSATE SODIUM 100 MG: 100 CAPSULE, LIQUID FILLED ORAL at 20:43

## 2022-12-03 ASSESSMENT — PAIN SCALES - GENERAL
PAINLEVEL_OUTOF10: 7
PAINLEVEL_OUTOF10: 9
PAINLEVEL_OUTOF10: 10
PAINLEVEL_OUTOF10: 9
PAINLEVEL_OUTOF10: 6

## 2022-12-03 ASSESSMENT — PAIN DESCRIPTION - ORIENTATION: ORIENTATION: LOWER

## 2022-12-03 ASSESSMENT — PAIN DESCRIPTION - LOCATION
LOCATION: ABDOMEN;INCISION

## 2022-12-03 ASSESSMENT — PAIN DESCRIPTION - DESCRIPTORS
DESCRIPTORS: CRAMPING;DISCOMFORT
DESCRIPTORS: BURNING

## 2022-12-03 NOTE — PROGRESS NOTES
Subjective:    Patient without complaints. Normal lochia. Tolerating PO. Bowel movements: No  Flatus: Yes  Ambulating: Yes    Objective:  /60   Pulse 93   Temp 98.6 °F (37 °C) (Oral)   Resp 18   LMP 03/04/2022   SpO2 96%   Breastfeeding Unknown   Lungs:  CTA   Cardiac:  Regular rhythm  Abdomen:  Uterus firm, non-tender, incision covered with mepilex, +bs  Extremities:  No calf pain    Lab Results   Component Value Date    HGB 11.2 (L) 12/01/2022        Assessment:  Post-operative day # 3  Repeat c/s at 38 wks  Hepatitis c  Chronic htn  Type 2 diabetes    Plan: Discharge home. Office next week. Call if pain, fever, emesis, calf pain, shortness of breath, heavy bleeding, incisional problems such as redness, drainage, or seperation , breast pain or redness . Nothing in vagina and no heavy lifting .  Told to also follow up with family physician

## 2022-12-03 NOTE — DISCHARGE INSTRUCTIONS
Follow up with your OB doctor in 2 week for a  delivery or in 6 weeks for a vaginal delivery unless otherwise instructed, call the office for appointment. .  For breastfeeding support, you can contact our lactation specialists at [phone] or   [phone]. DIET  Eat a well balanced diet focusing on foods high in fiber and protein  Drink plenty of fluids especially water. To avoid constipation you may take a mild stool softener as recommended by your doctor or midwife. ACTIVITY  Gradually increase your activity. Resume exercise regimen only after advised by your doctor or midwife. Avoid lifting anything heavier than your baby or a gallon of milk until OK'd by your physician or midlife. Avoid driving until your doctor or midwife has given their approval.  Sampson Nim slowly from a lying to sitting and then a standing position. Climb stairs one at a time. Use caution when carrying your baby up and down the stairs. No sexual activity for 6 weeks or until advised by your doctor - Nothing in vagina: intercourse, tampons, or douching. Be prepared to discuss family planning at your follow-up OB visit. You may feel tired or have a lack of energy. You may continue your prenatal vitamin to replenish nutrients post delivery. Nap when infant naps to catch up on sleep. May return to work or school in 6 weeks or as directed by physician or midlife. Take pain medication as prescribed whenever you need them  Take care of yourself by sleeping/resting as much as possible. Let someone else care for you, your infant and housework as much as possible for a while. EMOTIONS  You may feed eubanks, sad, teary, & overwhelmed. If infant will not stop crying, contact another adult for help or place infant in their crib on their back and take a break. NEVER shake your infant.     Call your doctor if you have unrelieved feelings of: inability to cope, anxiety, lack of interest in the infant/family, eating and sleeping disturbances,     BLEEDING  Vaginal bleeding will decrease in amount over the next few weeks. It should be like the end of your period like a brownish discharge. No different odor or color than a regular period. You will notice that as your activity increases, your flow may increase. This is your body's way of telling you, you need to take things easier and rest more often. Abdominal cramping should not get any worse than it is now. Take your pain medications as needed for the next few days. BREAST CARE  For breastfeeding moms:  If you become engorged, feeding may be more difficult or painful for 1-2 days. You may find it helpful to hand express some milk so that the infant can latch on more easily. While breastfeeding, continue to take your prenatal vitamins as directed by your doctor or midwife. Only take medications verified as safe for breastfeeding. If you start any new medications other than the ones you have had in the hospital, contact your pediatrician to see if they are safe for breastfeeding. Wear a support bra 24/7. You can pump your milk after breast feeding and freeze milk for six months in the freezer. When you are ready to use it, thaw it out in the refrigerator and use in 24 hours. Label the containers made for breast milk with day and time of pumping. Establish breast feeding for 2 weeks before you pump breast milk to save. Use your lanolin ointment/cream on your nipples after baby nurses. You need not wipe it off when baby nurses again. There are nipple shields and disks for sore nipples. Babies should eat every two to three hours. Avoid gassy foods (cabbage, onions, saurkraut, baked beans, cauliflower, broccoli) and spicy [place] or [place] foods. They might give the baby gas or make your milk taste funny to baby.  But if you have a craving, eat the food and see if it affects the baby and if it does, delete it from your diet. If it does not affect the baby, go ahead and eat it. Avoid caffeine at bedtime. (chocolate has a lot of caffeine) if the baby is sensitive to it. For non-breastfeeding moms:  You may apply ice packs to your breasts over your bra for twenty minutes at a time for comfort. Avoid stimulation to your breasts, when showering allow the water to strike your back not your breasts. Wear a good fitting bra until your milk dries, such as a sports bra. If your breasts are very sore, buy a cabbage and wet some of the inner leaves and place in your bra over your breasts. Your breasts may feel warm when your milk comes in. This is normal.    INCISIONAL CARE / BERNICE CARE  Clean your incision in the shower with mild soap. After shower pat the incision area dry and leave open to air. If used, Steri-stipes should fall off in shower by 2 weeks. If used, Jacquelene Alec should be removed by the OB in office by 1 week. If used/ordered, an abdominal binder may provide support for your incision. Use the bernice-bottle after toileting until bleeding stops. It promotes healing and prevents infection. You are prone to urinary tract infections after a delivery ( c section or vaginal delivery). Drink a lot of fluids. Take a shower instead of a tub bath until bleeding stops. Cleanse your perineum from front to back  If used, stitches or internal clips will dissolve in 4-6 weeks. You may use a sitz bath or soak in a clean tub as needed for comfort. Kegel exercises will help restore bladder control. You may use cool compress on incision site. SWELLING   It takes about 2 weeks to get rid of all of the extra fluid you have from having a baby. Your feet and hands may swell up more than they are now. Keep your legs elevated when sitting or lying. When wearing stocking or socks, make sure they are not too tight. WHEN TO CALL THE DOCTOR  If you have a temp of 100.4 or more.    Your abdomen is tender to touch.  You are passing blood clots bigger than the size of a lemon. If you are experiencing extreme weakness or dizziness. If you are having flu-like symptoms such as achy muscles or joints. If you have pain that cannot be relieved. You have persistent burning with urination or frequency. You have swelling, bleeding, drainage, foul odor, redness, or warmth in/around your incision or stitches. You have a red, warm, tender area in you calf. Call if you have concerns about your well-being or if you feel you may be showing signs of post partum depression, or have thoughts of harming yourself or infant. Increased pain at the site of the episiotomy. Difficulty urinating. Difficulty breathing with or without chest pain. Headache not relieved by pain meds. If you are saturating more than one maxi pad in an hour, foul smelling vaginal discharge, sudden continuing increased vaginal bleeding with or without clots. If you develop a warm, red, tender area on your breast, have nipple discharge which is foul smelling or contains pus, or develop a fever. NEVER SHAKE A BABY PROMISE. Shaking can kill a baby. It can also cause seizures, brain damage, learning problems,  Cerebral palsy, blindness and other serious health and developmental problems. I PROMISE NEVER TO SHAKE MY BABY    I understand that caregivers other than the mother often shakes babies. I also promise to discuss the dangers of shaking a baby with everyone who takes care of my baby. I promise to tell anyone who care for my baby to never, never shake my baby.

## 2022-12-04 NOTE — PROGRESS NOTES
Patient has no car seat for infant and no ride tonight. Notified Dr Jericho Izquierdo. Discharge for infant canceled. Mom to stay courtesy with infant.

## 2022-12-07 NOTE — DISCHARGE SUMMARY
[address] [address]                               DISCHARGE SUMMARY    PATIENT NAME: Clarita Urban              :        1990  MED REC NO:   [id number]                            ROOM:       0309  ACCOUNT NO:   [de-identified]                           ADMIT DATE: 2022  PROVIDER:     Gm South MD                  [address] [place] [address] DATE:  2022    BRIEF HISTORY AND HOSPITAL COURSE:  The patient underwent a repeat   section on 2022 by Dr. Stephanie Melchor. The patient was at 38  weeks and 6 days. Two prior C-sections _____ labor. Hepatitis C  positive. Infant weighed 3530 gm. Apgar's were 8 and 9. No  complications. I was the house officer rounding on postop day #3. The  patient had no complaints. Normal lochia. Blood pressure was 120/60. Uterus was firm and nontender. Incision was covered with Mepilex. No  calf pain. Hemoglobin was 11.2. The patient also has a history of  chronic hypertension and type 2 diabetes. The patient was discharged  home with precautions and instructed to follow up with me in the office  the following week. Precautions were given.         Nannette Ceballos MD    D: 2022 16:54:24       T: 2022 0:20:52     [email or web address]  Job#: [id number]     Doc#: [id number]    CC:

## 2024-02-14 ENCOUNTER — INITIAL PRENATAL (OUTPATIENT)
Dept: OBGYN | Age: 34
End: 2024-02-14
Payer: COMMERCIAL

## 2024-02-14 ENCOUNTER — ANCILLARY PROCEDURE (OUTPATIENT)
Dept: OBGYN CLINIC | Age: 34
End: 2024-02-14
Payer: COMMERCIAL

## 2024-02-14 ENCOUNTER — INITIAL PRENATAL (OUTPATIENT)
Dept: OBGYN CLINIC | Age: 34
End: 2024-02-14

## 2024-02-14 ENCOUNTER — HOSPITAL ENCOUNTER (OUTPATIENT)
Age: 34
Setting detail: OBSERVATION
Discharge: ANOTHER HOSPITAL | End: 2024-02-15
Attending: OBSTETRICS & GYNECOLOGY | Admitting: OBSTETRICS & GYNECOLOGY
Payer: COMMERCIAL

## 2024-02-14 VITALS
HEIGHT: 68 IN | WEIGHT: 250.8 LBS | SYSTOLIC BLOOD PRESSURE: 119 MMHG | BODY MASS INDEX: 38.01 KG/M2 | DIASTOLIC BLOOD PRESSURE: 71 MMHG | HEART RATE: 81 BPM

## 2024-02-14 DIAGNOSIS — M32.9 SYSTEMIC LUPUS ERYTHEMATOSUS, UNSPECIFIED SLE TYPE, UNSPECIFIED ORGAN INVOLVEMENT STATUS (HCC): ICD-10-CM

## 2024-02-14 DIAGNOSIS — O99.333 HIGH RISK PREGNANCY DUE TO SMOKING IN THIRD TRIMESTER: ICD-10-CM

## 2024-02-14 DIAGNOSIS — E66.01 SEVERE OBESITY DUE TO EXCESS CALORIES AFFECTING PREGNANCY IN THIRD TRIMESTER (HCC): ICD-10-CM

## 2024-02-14 DIAGNOSIS — O99.280 THYROID DYSFUNCTION, ANTEPARTUM: ICD-10-CM

## 2024-02-14 DIAGNOSIS — O98.413 CHRONIC HEPATITIS C AFFECTING ANTEPARTUM CARE OF MOTHER IN THIRD TRIMESTER (HCC): ICD-10-CM

## 2024-02-14 DIAGNOSIS — I10 ESSENTIAL HYPERTENSION: ICD-10-CM

## 2024-02-14 DIAGNOSIS — O26.20 HABITUAL ABORTER, ANTEPARTUM: ICD-10-CM

## 2024-02-14 DIAGNOSIS — O99.342 BIPOLAR DISEASE DURING PREGNANCY IN SECOND TRIMESTER (HCC): ICD-10-CM

## 2024-02-14 DIAGNOSIS — O35.9XX0 FETAL ABNORMALITY AFFECTING MANAGEMENT OF MOTHER, SINGLE OR UNSPECIFIED FETUS: ICD-10-CM

## 2024-02-14 DIAGNOSIS — O09.33 NO PRENATAL CARE IN CURRENT PREGNANCY IN THIRD TRIMESTER: ICD-10-CM

## 2024-02-14 DIAGNOSIS — B18.2 CHRONIC HEPATITIS C AFFECTING ANTEPARTUM CARE OF MOTHER IN THIRD TRIMESTER (HCC): ICD-10-CM

## 2024-02-14 DIAGNOSIS — O09.93 HIGH-RISK PREGNANCY, THIRD TRIMESTER: Primary | ICD-10-CM

## 2024-02-14 DIAGNOSIS — O09.93 HIGH-RISK PREGNANCY, THIRD TRIMESTER: ICD-10-CM

## 2024-02-14 DIAGNOSIS — O34.219 PREVIOUS CESAREAN DELIVERY AFFECTING PREGNANCY: ICD-10-CM

## 2024-02-14 DIAGNOSIS — E07.9 THYROID DYSFUNCTION, ANTEPARTUM: ICD-10-CM

## 2024-02-14 DIAGNOSIS — O34.219 PREVIOUS CESAREAN SECTION COMPLICATING PREGNANCY, ANTEPARTUM CONDITION OR COMPLICATION: Primary | ICD-10-CM

## 2024-02-14 DIAGNOSIS — O34.219 PREVIOUS CESAREAN DELIVERY AFFECTING PREGNANCY: Primary | ICD-10-CM

## 2024-02-14 DIAGNOSIS — O24.113 TYPE 2 DIABETES MELLITUS AFFECTING PREGNANCY IN THIRD TRIMESTER, ANTEPARTUM: ICD-10-CM

## 2024-02-14 DIAGNOSIS — O26.93 COMPLICATED PREGNANCY, THIRD TRIMESTER: ICD-10-CM

## 2024-02-14 DIAGNOSIS — F31.9 BIPOLAR DISEASE DURING PREGNANCY IN SECOND TRIMESTER (HCC): ICD-10-CM

## 2024-02-14 DIAGNOSIS — O99.213 SEVERE OBESITY DUE TO EXCESS CALORIES AFFECTING PREGNANCY IN THIRD TRIMESTER (HCC): ICD-10-CM

## 2024-02-14 DIAGNOSIS — O34.219 PREVIOUS CESAREAN SECTION COMPLICATING PREGNANCY, ANTEPARTUM CONDITION OR COMPLICATION: ICD-10-CM

## 2024-02-14 PROBLEM — O35.BXX0 ATRIAL SEPTAL DEFECT OF FETUS AFFECTING ANTEPARTUM CARE OF MOTHER: Status: ACTIVE | Noted: 2024-02-14

## 2024-02-14 PROBLEM — O35.09X0 PREGNANCY COMPLICATED BY FETAL CEREBRAL VENTRICULOMEGALY: Status: ACTIVE | Noted: 2024-02-14

## 2024-02-14 PROBLEM — Z98.891 HISTORY OF C-SECTION: Status: ACTIVE | Noted: 2024-02-14

## 2024-02-14 PROBLEM — Z87.59 HISTORY OF PRE-ECLAMPSIA: Status: ACTIVE | Noted: 2024-02-14

## 2024-02-14 PROBLEM — F12.90 MARIJUANA USE: Status: ACTIVE | Noted: 2024-02-14

## 2024-02-14 PROBLEM — Z82.79 FAMILY HISTORY OF NEURAL TUBE DEFECT: Status: ACTIVE | Noted: 2024-02-14

## 2024-02-14 PROBLEM — O99.340 DEPRESSION AFFECTING PREGNANCY: Status: ACTIVE | Noted: 2024-02-14

## 2024-02-14 PROBLEM — Z86.39 HISTORY OF HYPOTHYROIDISM: Status: ACTIVE | Noted: 2024-02-14

## 2024-02-14 PROBLEM — O26.23 HIGH RISK PREGNANCY DUE TO RECURRENT PREGNANCY LOSS IN THIRD TRIMESTER: Status: ACTIVE | Noted: 2024-02-14

## 2024-02-14 PROBLEM — F32.A DEPRESSION AFFECTING PREGNANCY: Status: ACTIVE | Noted: 2024-02-14

## 2024-02-14 PROBLEM — F41.9 ANXIETY DURING PREGNANCY: Status: ACTIVE | Noted: 2024-02-14

## 2024-02-14 PROBLEM — O09.523 ADVANCED MATERNAL AGE IN MULTIGRAVIDA, THIRD TRIMESTER: Status: ACTIVE | Noted: 2024-02-14

## 2024-02-14 PROBLEM — O99.340 ANXIETY DURING PREGNANCY: Status: ACTIVE | Noted: 2024-02-14

## 2024-02-14 PROBLEM — O09.893 ENCOUNTER FOR SUPERVISION OF HIGH RISK PREGNANCY DUE TO FETAL ANOMALY, THIRD TRIMESTER: Status: ACTIVE | Noted: 2024-02-14

## 2024-02-14 PROBLEM — Z3A.38 38 WEEKS GESTATION OF PREGNANCY: Status: ACTIVE | Noted: 2024-02-14

## 2024-02-14 LAB
25(OH)D3 SERPL-MCNC: 7 NG/ML (ref 30–100)
ABO + RH BLD: NORMAL
ALBUMIN SERPL-MCNC: 2.8 G/DL (ref 3.5–5.2)
ALP SERPL-CCNC: 133 U/L (ref 35–104)
ALT SERPL-CCNC: 12 U/L (ref 0–32)
AMNISURE, POC: NEGATIVE
AMORPH SED URNS QL MICRO: PRESENT
AMPHET UR QL SCN: POSITIVE
ANION GAP SERPL CALCULATED.3IONS-SCNC: 13 MMOL/L (ref 7–16)
ARM BAND NUMBER: NORMAL
AST SERPL-CCNC: 20 U/L (ref 0–31)
BACTERIA URNS QL MICRO: ABNORMAL
BARBITURATES UR QL SCN: NEGATIVE
BASOPHILS # BLD: 0.04 K/UL (ref 0–0.2)
BASOPHILS NFR BLD: 0 % (ref 0–2)
BENZODIAZ UR QL: NEGATIVE
BILIRUB SERPL-MCNC: 0.2 MG/DL (ref 0–1.2)
BILIRUB UR QL STRIP: NEGATIVE
BILIRUB UR QL STRIP: NEGATIVE
BLOOD BANK SAMPLE EXPIRATION: NORMAL
BLOOD GROUP ANTIBODIES SERPL: NEGATIVE
BUN SERPL-MCNC: 10 MG/DL (ref 6–20)
BUPRENORPHINE UR QL: NEGATIVE
CALCIUM SERPL-MCNC: 8.6 MG/DL (ref 8.6–10.2)
CANNABINOIDS UR QL SCN: POSITIVE
CHLORIDE SERPL-SCNC: 99 MMOL/L (ref 98–107)
CLARITY UR: CLEAR
CLARITY UR: CLEAR
CO2 SERPL-SCNC: 19 MMOL/L (ref 22–29)
COCAINE UR QL SCN: NEGATIVE
COLOR UR: YELLOW
COLOR UR: YELLOW
CREAT SERPL-MCNC: 0.6 MG/DL (ref 0.5–1)
CREAT UR-MCNC: 112.8 MG/DL (ref 29–226)
EOSINOPHIL # BLD: 0.09 K/UL (ref 0.05–0.5)
EOSINOPHILS RELATIVE PERCENT: 1 % (ref 0–6)
EPI CELLS #/AREA URNS HPF: ABNORMAL /HPF
ERYTHROCYTE [DISTWIDTH] IN BLOOD BY AUTOMATED COUNT: 14.1 % (ref 11.5–15)
FENTANYL UR QL: NEGATIVE
FERRITIN SERPL-MCNC: 27 NG/ML
GFR SERPL CREATININE-BSD FRML MDRD: >60 ML/MIN/1.73M2
GLUCOSE SERPL-MCNC: 63 MG/DL (ref 74–99)
GLUCOSE UR STRIP-MCNC: NEGATIVE MG/DL
GLUCOSE UR STRIP-MCNC: NEGATIVE MG/DL
HBA1C MFR BLD: 6.1 % (ref 4–5.6)
HCT VFR BLD AUTO: 32.8 % (ref 34–48)
HGB BLD-MCNC: 10.8 G/DL (ref 11.5–15.5)
HGB UR QL STRIP.AUTO: NEGATIVE
HGB UR QL STRIP.AUTO: NEGATIVE
IMM GRANULOCYTES # BLD AUTO: 0.04 K/UL (ref 0–0.58)
IMM GRANULOCYTES NFR BLD: 0 % (ref 0–5)
KETONES UR STRIP-MCNC: 40 MG/DL
KETONES UR STRIP-MCNC: NEGATIVE MG/DL
LDH SERPL-CCNC: 278 U/L (ref 135–214)
LEUKOCYTE ESTERASE UR QL STRIP: NEGATIVE
LEUKOCYTE ESTERASE UR QL STRIP: NEGATIVE
LYMPHOCYTES NFR BLD: 2.69 K/UL (ref 1.5–4)
LYMPHOCYTES RELATIVE PERCENT: 29 % (ref 20–42)
Lab: NORMAL
MAGNESIUM SERPL-MCNC: 1.6 MG/DL (ref 1.6–2.6)
MCH RBC QN AUTO: 31.2 PG (ref 26–35)
MCHC RBC AUTO-ENTMCNC: 32.9 G/DL (ref 32–34.5)
MCV RBC AUTO: 94.8 FL (ref 80–99.9)
METHADONE UR QL: NEGATIVE
MONOCYTES NFR BLD: 0.53 K/UL (ref 0.1–0.95)
MONOCYTES NFR BLD: 6 % (ref 2–12)
MUCOUS THREADS URNS QL MICRO: PRESENT
NEGATIVE QC PASS/FAIL: NORMAL
NEUTROPHILS NFR BLD: 64 % (ref 43–80)
NEUTS SEG NFR BLD: 5.87 K/UL (ref 1.8–7.3)
NITRITE UR QL STRIP: NEGATIVE
NITRITE UR QL STRIP: NEGATIVE
OPIATES UR QL SCN: NEGATIVE
OXYCODONE UR QL SCN: NEGATIVE
PCP UR QL SCN: NEGATIVE
PH UR STRIP: 7 [PH] (ref 5–9)
PH UR STRIP: 7 [PH] (ref 5–9)
PLATELET # BLD AUTO: 246 K/UL (ref 130–450)
PMV BLD AUTO: 11 FL (ref 7–12)
POSITIVE QC PASS/FAIL: NORMAL
POTASSIUM SERPL-SCNC: 3.7 MMOL/L (ref 3.5–5)
PROT SERPL-MCNC: 5.9 G/DL (ref 6.4–8.3)
PROT UR STRIP-MCNC: 100 MG/DL
PROT UR STRIP-MCNC: 100 MG/DL
RBC # BLD AUTO: 3.46 M/UL (ref 3.5–5.5)
RBC #/AREA URNS HPF: ABNORMAL /HPF
RBC #/AREA URNS HPF: ABNORMAL /HPF
SODIUM SERPL-SCNC: 131 MMOL/L (ref 132–146)
SP GR UR STRIP: 1.02 (ref 1–1.03)
SP GR UR STRIP: >1.03 (ref 1–1.03)
T4 FREE SERPL-MCNC: 0.9 NG/DL (ref 0.9–1.7)
TOTAL PROTEIN, URINE: 229 MG/DL (ref 0–12)
TSH SERPL DL<=0.05 MIU/L-ACNC: 2.29 UIU/ML (ref 0.27–4.2)
URATE SERPL-MCNC: 5.1 MG/DL (ref 2.4–5.7)
URINE TOTAL PROTEIN CREATININE RATIO: 2.03 (ref 0–0.2)
UROBILINOGEN UR STRIP-ACNC: 0.2 EU/DL (ref 0–1)
UROBILINOGEN UR STRIP-ACNC: 0.2 EU/DL (ref 0–1)
WBC #/AREA URNS HPF: ABNORMAL /HPF
WBC #/AREA URNS HPF: ABNORMAL /HPF
WBC OTHER # BLD: 9.3 K/UL (ref 4.5–11.5)

## 2024-02-14 PROCEDURE — 84443 ASSAY THYROID STIM HORMONE: CPT

## 2024-02-14 PROCEDURE — 83036 HEMOGLOBIN GLYCOSYLATED A1C: CPT

## 2024-02-14 PROCEDURE — 86146 BETA-2 GLYCOPROTEIN ANTIBODY: CPT

## 2024-02-14 PROCEDURE — G0480 DRUG TEST DEF 1-7 CLASSES: HCPCS

## 2024-02-14 PROCEDURE — 86778 TOXOPLASMA ANTIBODY IGM: CPT

## 2024-02-14 PROCEDURE — 80053 COMPREHEN METABOLIC PANEL: CPT

## 2024-02-14 PROCEDURE — 87522 HEPATITIS C REVRS TRNSCRPJ: CPT

## 2024-02-14 PROCEDURE — 2022F DILAT RTA XM EVC RTNOPTHY: CPT | Performed by: MIDWIFE

## 2024-02-14 PROCEDURE — 80307 DRUG TEST PRSMV CHEM ANLYZR: CPT

## 2024-02-14 PROCEDURE — 86695 HERPES SIMPLEX TYPE 1 TEST: CPT

## 2024-02-14 PROCEDURE — 86038 ANTINUCLEAR ANTIBODIES: CPT

## 2024-02-14 PROCEDURE — 83615 LACTATE (LD) (LDH) ENZYME: CPT

## 2024-02-14 PROCEDURE — G8427 DOCREV CUR MEDS BY ELIG CLIN: HCPCS | Performed by: MIDWIFE

## 2024-02-14 PROCEDURE — G8419 CALC BMI OUT NRM PARAM NOF/U: HCPCS | Performed by: MIDWIFE

## 2024-02-14 PROCEDURE — 99213 OFFICE O/P EST LOW 20 MIN: CPT

## 2024-02-14 PROCEDURE — 86696 HERPES SIMPLEX TYPE 2 TEST: CPT

## 2024-02-14 PROCEDURE — 86900 BLOOD TYPING SEROLOGIC ABO: CPT

## 2024-02-14 PROCEDURE — 86901 BLOOD TYPING SEROLOGIC RH(D): CPT

## 2024-02-14 PROCEDURE — 99221 1ST HOSP IP/OBS SF/LOW 40: CPT | Performed by: NURSE PRACTITIONER

## 2024-02-14 PROCEDURE — G0378 HOSPITAL OBSERVATION PER HR: HCPCS

## 2024-02-14 PROCEDURE — 99203 OFFICE O/P NEW LOW 30 MIN: CPT | Performed by: MIDWIFE

## 2024-02-14 PROCEDURE — 86645 CMV ANTIBODY IGM: CPT

## 2024-02-14 PROCEDURE — 76820 UMBILICAL ARTERY ECHO: CPT | Performed by: OBSTETRICS & GYNECOLOGY

## 2024-02-14 PROCEDURE — 81002 URINALYSIS NONAUTO W/O SCOPE: CPT | Performed by: MIDWIFE

## 2024-02-14 PROCEDURE — 76819 FETAL BIOPHYS PROFIL W/O NST: CPT | Performed by: OBSTETRICS & GYNECOLOGY

## 2024-02-14 PROCEDURE — 84112 EVAL AMNIOTIC FLUID PROTEIN: CPT

## 2024-02-14 PROCEDURE — 99999 PR OFFICE/OUTPT VISIT,PROCEDURE ONLY: CPT | Performed by: OBSTETRICS & GYNECOLOGY

## 2024-02-14 PROCEDURE — 84439 ASSAY OF FREE THYROXINE: CPT

## 2024-02-14 PROCEDURE — 76821 MIDDLE CEREBRAL ARTERY ECHO: CPT | Performed by: OBSTETRICS & GYNECOLOGY

## 2024-02-14 PROCEDURE — 3074F SYST BP LT 130 MM HG: CPT | Performed by: MIDWIFE

## 2024-02-14 PROCEDURE — 86644 CMV ANTIBODY: CPT

## 2024-02-14 PROCEDURE — 99255 IP/OBS CONSLTJ NEW/EST HI 80: CPT | Performed by: OBSTETRICS & GYNECOLOGY

## 2024-02-14 PROCEDURE — 36415 COLL VENOUS BLD VENIPUNCTURE: CPT

## 2024-02-14 PROCEDURE — 76811 OB US DETAILED SNGL FETUS: CPT | Performed by: OBSTETRICS & GYNECOLOGY

## 2024-02-14 PROCEDURE — 4004F PT TOBACCO SCREEN RCVD TLK: CPT | Performed by: MIDWIFE

## 2024-02-14 PROCEDURE — 86376 MICROSOMAL ANTIBODY EACH: CPT

## 2024-02-14 PROCEDURE — 86039 ANTINUCLEAR ANTIBODIES (ANA): CPT

## 2024-02-14 PROCEDURE — 83020 HEMOGLOBIN ELECTROPHORESIS: CPT

## 2024-02-14 PROCEDURE — 84156 ASSAY OF PROTEIN URINE: CPT

## 2024-02-14 PROCEDURE — 82728 ASSAY OF FERRITIN: CPT

## 2024-02-14 PROCEDURE — 86762 RUBELLA ANTIBODY: CPT

## 2024-02-14 PROCEDURE — 59025 FETAL NON-STRESS TEST: CPT | Performed by: MIDWIFE

## 2024-02-14 PROCEDURE — 82570 ASSAY OF URINE CREATININE: CPT

## 2024-02-14 PROCEDURE — 82607 VITAMIN B-12: CPT

## 2024-02-14 PROCEDURE — 3078F DIAST BP <80 MM HG: CPT | Performed by: MIDWIFE

## 2024-02-14 PROCEDURE — 86850 RBC ANTIBODY SCREEN: CPT

## 2024-02-14 PROCEDURE — 86256 FLUORESCENT ANTIBODY TITER: CPT

## 2024-02-14 PROCEDURE — 84550 ASSAY OF BLOOD/URIC ACID: CPT

## 2024-02-14 PROCEDURE — 82746 ASSAY OF FOLIC ACID SERUM: CPT

## 2024-02-14 PROCEDURE — 3046F HEMOGLOBIN A1C LEVEL >9.0%: CPT | Performed by: MIDWIFE

## 2024-02-14 PROCEDURE — G8484 FLU IMMUNIZE NO ADMIN: HCPCS | Performed by: MIDWIFE

## 2024-02-14 PROCEDURE — 99203 OFFICE O/P NEW LOW 30 MIN: CPT

## 2024-02-14 PROCEDURE — 86800 THYROGLOBULIN ANTIBODY: CPT

## 2024-02-14 PROCEDURE — 82306 VITAMIN D 25 HYDROXY: CPT

## 2024-02-14 PROCEDURE — 86235 NUCLEAR ANTIGEN ANTIBODY: CPT

## 2024-02-14 PROCEDURE — 86147 CARDIOLIPIN ANTIBODY EA IG: CPT

## 2024-02-14 PROCEDURE — 86777 TOXOPLASMA ANTIBODY: CPT

## 2024-02-14 PROCEDURE — 85025 COMPLETE CBC W/AUTO DIFF WBC: CPT

## 2024-02-14 PROCEDURE — 86225 DNA ANTIBODY NATIVE: CPT

## 2024-02-14 PROCEDURE — 81001 URINALYSIS AUTO W/SCOPE: CPT

## 2024-02-14 PROCEDURE — 86694 HERPES SIMPLEX NES ANTBDY: CPT

## 2024-02-14 PROCEDURE — 2580000003 HC RX 258: Performed by: OBSTETRICS & GYNECOLOGY

## 2024-02-14 PROCEDURE — 86747 PARVOVIRUS ANTIBODY: CPT

## 2024-02-14 PROCEDURE — 83735 ASSAY OF MAGNESIUM: CPT

## 2024-02-14 RX ORDER — SODIUM CHLORIDE, SODIUM LACTATE, POTASSIUM CHLORIDE, AND CALCIUM CHLORIDE .6; .31; .03; .02 G/100ML; G/100ML; G/100ML; G/100ML
500 INJECTION, SOLUTION INTRAVENOUS ONCE
Status: COMPLETED | OUTPATIENT
Start: 2024-02-14 | End: 2024-02-14

## 2024-02-14 RX ORDER — PNV NO.95/FERROUS FUM/FOLIC AC 28MG-0.8MG
1 TABLET ORAL DAILY
Qty: 30 TABLET | Refills: 11 | Status: SHIPPED | OUTPATIENT
Start: 2024-02-14

## 2024-02-14 RX ORDER — SODIUM CHLORIDE, SODIUM LACTATE, POTASSIUM CHLORIDE, CALCIUM CHLORIDE 600; 310; 30; 20 MG/100ML; MG/100ML; MG/100ML; MG/100ML
INJECTION, SOLUTION INTRAVENOUS CONTINUOUS
Status: DISCONTINUED | OUTPATIENT
Start: 2024-02-14 | End: 2024-02-15 | Stop reason: HOSPADM

## 2024-02-14 RX ADMIN — SODIUM CHLORIDE, POTASSIUM CHLORIDE, SODIUM LACTATE AND CALCIUM CHLORIDE: 600; 310; 30; 20 INJECTION, SOLUTION INTRAVENOUS at 18:31

## 2024-02-14 RX ADMIN — SODIUM CHLORIDE, POTASSIUM CHLORIDE, SODIUM LACTATE AND CALCIUM CHLORIDE 500 ML: 600; 310; 30; 20 INJECTION, SOLUTION INTRAVENOUS at 17:40

## 2024-02-14 NOTE — PROGRESS NOTES
Patient alert and pleasant with no complaints.  Here today for initial prenatal visit.  Fetal heart tones obtained without difficulty.  Assisted with pelvic exam, GBS specimens obtained, labeled and sent to the lab.  Discharge instructions have been discussed with the patient. Patient advised to call our office with any questions or concerns.   Voiced understanding.    
delivery affecting pregnancy O34.219       PAST OB HISTORY  OB History          13    Para   4    Term   4       0    AB   8    Living   4         SAB   8    IAB   0    Ectopic   0    Molar   0    Multiple   0    Live Births   4          Obstetric Comments   9 SAB               Past Medical History:          Diagnosis Date    Anxiety     ASD (atrial septal defect) 2021    - She has an ASD - No surgical repair   Recommend; Cardiology consultation echocardiogram    Asthma     Bipolar 1 disorder (HCC)      delivery delivered 2021    Chlamydia     Chronic hepatitis C affecting antepartum care of mother in third trimester (HCC) 2022    Complication of pregnancy, third trimester 2021    Depression     Diabetes mellitus (HCC)     FH: multiple miscarriages or stillbirths     Hypertension     Hypothyroidism     Maternal care due to low transverse uterine scar from previous  delivery 2021    Pre-eclampsia     Previous  delivery affecting pregnancy 2022    Septal defect, atrial     Shingles     STD (sexually transmitted disease)     Thyroid disease     Type 2 diabetes mellitus without complication (HCC)        Past Surgical History:          Procedure Laterality Date    ABDOMEN SURGERY       SECTION  2017     SECTION N/A 2021    REPEAT  SECTION performed by Paz [facility]ar MD at [facility]     SECTION N/A 2022     SECTION performed by Scar Duarte MD at [facility] OR    DILATION AND CURETTAGE      DILATION AND CURETTAGE OF UTERUS      ENDOSCOPY, COLON, DIAGNOSTIC         Social History:    TOBACCO:   reports that she has been smoking cigarettes. She has a 3.0 pack-year smoking history. She has never used smokeless tobacco.  ETOH:   reports that she does not currently use alcohol.  DRUGS:   reports current drug use. Frequency: 1.00 time per week. Drug: Marijuana (Weed).  Family History:

## 2024-02-14 NOTE — PROGRESS NOTES
Dr. Shepard at the nurses station and ordered for patient to be transferred to a higher level of care facility. They are awaiting call back from an accepting physician.

## 2024-02-14 NOTE — DISCHARGE INSTRUCTIONS
Home Undelivered Discharge Instructions    After Discharge Orders:                    Diet:  normal diet as tolerated    Rest: normal activity as tolerated    Other instructions: Do kick counts once a day on your baby. Choose the time of day your baby is most active. Get in a comfortable lying or sitting position and time how long it takes to feel 10 kicks, twists, turns, swishes, or rolls. Call your physician or midwife if there have not been 10 kicks in 2 hours    Call physician or midwife, return to Labor and Delivery, call 911, or go to the nearest Emergency Room if: increased leakage or fluid, contractions more than  5 per  1 hour, decreased fetal movement, persistent low back pain or cramping, bleeding from vaginal area, difficulty urinating, pain with urination, difficulty breathing, new calf pain, persistent headache, or vision change

## 2024-02-14 NOTE — CONSULTS
A consult was requested by Dr. Duarte       RE:  GALINA SUMMERS  : 1990   AGE: 33 y.o.    Dear Dr. Duarte:     I saw your patient Galina Summers today for the following indications: Limited prenatal care,?  Fetal hydrocephalus    Patient Active Problem List   Diagnosis    Type 2 diabetes mellitus affecting pregnancy in third trimester, antepartum    Essential hypertension    Habitual aborter, antepartum    Obesity complicating pregnancy, third trimester    Thyroid dysfunction, antepartum    Hereditary disease in family possibly affecting fetus, affecting management of mother, antepartum condition or complication, single gestation    High risk pregnancy due to smoking in third trimester    Bipolar disease during pregnancy in second trimester (HCC)    Complicated pregnancy, third trimester    Maternal care due to low transverse uterine scar from previous  delivery    Previous  section complicating pregnancy, antepartum condition or complication    Chronic hepatitis C affecting antepartum care of mother in third trimester (HCC)    Previous  delivery affecting pregnancy    38 weeks gestation of pregnancy       As you know, Ms. Summers is a 33 y.o.  at 39w5d  (LMP = 17 wk US) who is admitted with limited prenatal care.    The patient was initially seeing Dr. Villar. She was last seen at 17w6d.  At that time, there was concern for hydrocephalus.  Per the patient, she was referred to maternal-fetal medicine for additional evaluation but had difficulty with transportation.  She reports that she has been having trouble finding an OB until today.  She was scheduled for her first visit today with Tania Fraser CNM. The records were reviewed and she was sent to the hospital for evaluation.      She notes fetal movement.  She reports having some leaking of fluid for the past 3 days.  She denies any vaginal bleeding. She reports having occasional contractions. She reports

## 2024-02-14 NOTE — PROGRESS NOTES
G 13 P 8 presents to unit with c/o lof x 3 days. Denies vaginal bleeding.. Perceives good fetal movement. EDC:2/22/24. 38 weeks 6 days gestation. Pt reports hypertension, lupus,  type II diabetes. Placed on efm. Amnisure done.

## 2024-02-14 NOTE — PATIENT INSTRUCTIONS
violence.   [facility]   Permanent Supportive Housing  [address] [place] [address]    Website: www.[employer]         [phone]  To be eligible, a woman must be homeless with a documented disability.   [facility]    [address] [facility] [address]  Special Language Services:  services available  Website: [place]           [phone]   Services for women and women with children including transitional housing and permanent supportive housing.    [facility] Rental Assistance and Homeless Resources    https://www.[facility].gov/topics/rental_assistance           ADDITIONAL HOUSING ASSISTANCE  [facility]  Name & Address Phone Number Services   [facility]  [address], [place] [address]  Website: www.[facility]     [phone] Permanent housing and supportive services for chronically homeless men and women with a verified disability       [facility] [employer]   [phone] Permanent supportive housing for individuals with HIV/AIDS and/or a chronic mental illness and their families.       [facility] [phone] Permanent housing for homeless, chemically dependent women and their children     [facility] [phone] Permanent, independent housing for low-income senior citizens     [facility] [phone] Permanent, independent housing for adults with a chronic mental illness     [facility] [phone] Supportive housing for homeless, chemically dependent women with children           [facility] [phone] Permanent supportive housing program for individuals who are low income and considered homeless or at risk of becoming homeless who have severe mental illness, an addiction disorder(s) or co-occurring disorders.     [facility]    [address] [place] [address]    Website:  www.[employer]   [phone] Possible assistance with rent, utilities.   [facility]

## 2024-02-14 NOTE — PROGRESS NOTES
Reported ctx pattern to house officer and new order for IV  mL bolus then 150 mL/hr, and terbutaline if ctx pattern increases.

## 2024-02-14 NOTE — PROGRESS NOTES
Upon bringing patient back for her add on ultrasound & appt with Dr. New, it was discovered that the patient was complaining of LOF. Pt is 38w6d with 3 prev C/sections and limited PNC/poor compliance. Discussed with Dr. New and he requested that the patient be sent to L&D for evaluation. I explained this to patient and patient agreed to go to L&D for further evaluation.

## 2024-02-14 NOTE — PROGRESS NOTES
Called [facility] for transport to [facility].  Awaiting transport as of right now eta is approximately 4-5 hours

## 2024-02-14 NOTE — H&P
Department of Obstetrics and Gynecology  Labor and Delivery  History & Physical    Patient:  Galina Summers     Admit Date:  2024 10:16 AM  Medical Record Number:  [id number]    CHIEF COMPLAINT:  no prenatal care    PROBLEM LIST:     Patient Active Problem List   Diagnosis    Type 2 diabetes mellitus affecting pregnancy in third trimester, antepartum    Essential hypertension    Habitual aborter, antepartum    Obesity complicating pregnancy, third trimester    Thyroid dysfunction, antepartum    Hereditary disease in family possibly affecting fetus, affecting management of mother, antepartum condition or complication, single gestation    High risk pregnancy due to smoking in third trimester    Bipolar disease during pregnancy in second trimester (HCC)    Complicated pregnancy, third trimester    Maternal care due to low transverse uterine scar from previous  delivery    Previous  section complicating pregnancy, antepartum condition or complication    Chronic hepatitis C affecting antepartum care of mother in third trimester (HCC)    Previous  delivery affecting pregnancy    38 weeks gestation of pregnancy           HISTORY OF PRESENT ILLNESS:    The patient is a 33 y.o.   female  at 38w6d. Patient presents with a chief complaint as above. Pt seen today at [facility], pt states she was seen for one visit with dr andrews   Here for rule out rupture. She then lost her insurance and housing and has not been able to find a ob/gyn to accept her.   Questionable hydrocephalus, pt did not follow up with mfm.   +UDS  Hx of previous c/s x3, one vaginal delivery and 8 SAB       ESTIMATED DUE DATE: Estimated Date of Delivery: 24    PRENATAL CARE:  Complicated by: type 2 dm. Essential htn, habitual aborter, no prenatal care, chronic hep c+       Past OB History  OB History          13    Para   4    Term   4       0    AB   8    Living   4         SAB   8    IAB   0

## 2024-02-15 VITALS
WEIGHT: 251 LBS | HEIGHT: 68 IN | SYSTOLIC BLOOD PRESSURE: 135 MMHG | TEMPERATURE: 98 F | OXYGEN SATURATION: 99 % | HEART RATE: 83 BPM | DIASTOLIC BLOOD PRESSURE: 73 MMHG | RESPIRATION RATE: 17 BRPM | BODY MASS INDEX: 38.04 KG/M2

## 2024-02-15 LAB
ABNORMAL SPECIMEN VALIDITY TEST: NORMAL
AMPHET UR-MCNC: 281.3 NG/ML
ANA SER QL IA: NEGATIVE
ANTI DNA DOUBLE STRANDED: NEGATIVE
COMPLIANCE DRUG ANALYSIS, URINE: NORMAL
ENA SM AB SER QL: NEGATIVE
EPHEDRIN UR QL: <100 NG/ML
FOLATE SERPL-MCNC: 17.3 NG/ML (ref 4.8–24.2)
INTEGRITY CHECK, CREATININE, URINE: 125.5 MG/DL (ref 22–250)
INTEGRITY CHECK, OXIDANT, URINE: <40 MG/L
INTEGRITY CHECK, PH, URINE: 6.7 (ref 4.5–9)
INTEGRITY CHECK, SPECIFIC GRAVITY, URINE: 1.02 (ref 1–1.03)
JO-1 ANTIBODY: NEGATIVE
MDA, QUANTITATIVE, URINE: <100 NG/ML
MDEA, QUANTITATIVE, URINE: <100 NG/ML
MDMA UR QL: <100 NG/ML
METHAMPHETAMINE QUANTITATIVE URINE: 351.4 NG/ML
PHENTERMINE, URINE, QUANTITATIVE: <100 NG/ML
SCLERODERMA (SCL-70) AB: NEGATIVE
SJOGREN'S ANTIBODIES (SSA): NEGATIVE
SJOGREN'S ANTIBODIES (SSB): NEGATIVE
THC-COOH, QUANTITATIVE, URINE: 171.6 NG/ML
U1 SNRNP IGG SER-ACNC: NEGATIVE
VIT B12 SERPL-MCNC: 238 PG/ML (ref 211–946)

## 2024-02-15 PROCEDURE — G0378 HOSPITAL OBSERVATION PER HR: HCPCS

## 2024-02-15 NOTE — PROGRESS NOTES
Assumed patient care. Patient is lying bed comfortably with her call light in reach and her table at the bedside.

## 2024-02-15 NOTE — PROGRESS NOTES
Attempted to call [facility] no one answered the phone and I was unable to leave a message.

## 2024-02-16 LAB
CMV IGG SERPL QL IA: <0.3
CMV IGM SERPL QL IA: 0.1
HSV1 IGG SERPL QL IA: NORMAL
HSV1+2 IGM SER QL IA: NORMAL
HSV2 AB SER QL IA: NORMAL
RUBV IGG SERPL QL IA: 21.92 IU/ML
T GONDII IGG SER-ACNC: <0.5 IU/ML
T GONDII IGM SER-ACNC: 1.08 INDEX

## 2024-02-17 LAB
B2 GLYCOPROT1 IGA SERPL IA-ACNC: 2.7 ELISA U/ML (ref 0–7)
B2 GLYCOPROT1 IGG SERPL IA-ACNC: 0.9 ELISA U/ML (ref 0–7)
B2 GLYCOPROT1 IGM SERPL IA-ACNC: 2.7 ELISA U/ML (ref 0–7)
CARDIOLIPIN IGA SER IA-ACNC: 3.1 APL (ref 0–14)
CARDIOLIPIN IGG SER IA-ACNC: 0.9 GPL (ref 0–10)
CARDIOLIPIN IGM SER IA-ACNC: 2.3 MPL (ref 0–10)
HCV RNA SERPL NAA+PROBE-ACNC: ABNORMAL IU/ML
HCV RNA SERPL NAA+PROBE-LOG IU: 4.64 LOG IU/ML
HCV RNA SERPL QL NAA+PROBE: DETECTED
THYROGLOBULIN AB: <12 IU/ML (ref 0–40)
THYROPEROXIDASE AB SERPL IA-ACNC: <4 IU/ML (ref 0–25)

## 2024-02-18 LAB
CULTURE: NORMAL
SPECIMEN DESCRIPTION: NORMAL

## 2024-02-19 LAB
HCV GENTYP SERPL NAA+PROBE: NORMAL
HCV GENTYP SERPL NAA+PROBE: NORMAL
HGB ELECTROPHORESIS INTERP: NORMAL
PATHOLOGIST: NORMAL

## 2024-02-20 LAB
CMV IGG SERPL QL IA: <0.3
CMV IGM SERPL QL IA: 0.1
HSV1 IGG SERPL QL IA: 4.77
HSV1+2 IGM SER QL IA: 0.45
HSV2 AB SER QL IA: 5.9
PARVOVIRUS B19 IGG ANTIBODY: 1.07 IV
PARVOVIRUS B19 IGM ANTIBODY: 0.21 IV
RUBV IGG SERPL QL IA: 21.92 IU/ML
T GONDII IGG SER-ACNC: <0.5 IU/ML
T GONDII IGM SER-ACNC: 1.08 INDEX

## 2024-02-28 ENCOUNTER — TELEPHONE (OUTPATIENT)
Dept: FAMILY MEDICINE CLINIC | Age: 34
End: 2024-02-28

## 2024-02-28 NOTE — TELEPHONE ENCOUNTER
LSW attempted phone call to [facility], [facility]/NICU  SW,  Anat LAVERNE Ching, [phone].  Left VM message requesting return phone call.  LSW advised was following up on social work referral received 24 prior to patient's admission to [facility] and delivery, was calling regarding status on  baby girl  24 to confirm safety due to [facility] involvement and patient intent for  adoption with cousin, in addition to preventing duplication of  services due to SW stated plan to continue follow up with patient through post  care; patient has OB postpartum visit scheduled 3/4/04.       LSW had received referral to social work on 24 due to high risk pregnancy, bipolar, no prenatal care, extensive medical history.   Reviewed chart, patient admitted to [facility] on 24, first seen at [facility], sent to [facility] and then L&D for evaluation, transferred to [facility].        LSW reviewed extensive detailed notes from 24 M, Dr. Shepard and 2/15/24 [facility]/NICU  SW, LAVERNE Torres.  Per chart,  delivery 24, baby girl, sent to NICU.   Per MFM consult note on past OB history and  consult 2/15/24, patient has had 14 pregnancies, 9 SABs between [id number], total of 5 live births now with , does not have custody of 4 older children, Heydi (F)  07, Tammy (F)  11/15/17 (adopted), Lauri Read (M)  21 and Breana Read (F)  22 (in foster care with [facility]).       Per  note, open case with [facility],  Sirisha Cardozo, [phone] reported patient not following case plan for parenting classes and unable to maintain sobriety; history of THC, cocaine, methamphetamine and amphetamine use with positive tox screens. [place] [facility] [place] [facility], unemployed.  One of her children

## 2024-03-25 ENCOUNTER — TELEPHONE (OUTPATIENT)
Dept: FAMILY MEDICINE CLINIC | Age: 34
End: 2024-03-25

## 2024-03-25 NOTE — TELEPHONE ENCOUNTER
LSW attempted follow up phone call to [facility], MFM/NICU  SW,  Anat Ching, MEGHANA-S, [phone].  Left VM message requesting return phone call regarding status update.      LSW made phone call to patient.   LSW advised was making follow up call to patient for missed OB postpartum visit 3/6/24 with LAYNE De Los Santos and encouraged patient to reschedule.  Patient stated she was on the move and had too much going on.      Patient reported  baby, Damaris Mendoza  24 named after father Myron Mendoza, baby in therapeutic foster care in [place].  Patient still has open case with [facility] now for both children Breana (Lisandra) Jacek  22 and baby Damaris.  Has same , Sirisha Cardozo with active case plan for reunification.      LSW inquired if patient went to counseling yet, patient stated she was working on it.  LSW encouraged patient to follow up with OBGYN and establish with a PCP, Patient declined LSW offer to send patient a list of [facility].  Patient stated she is literally on the move and doesn't have a mailing address.  LSW encouraged patient to cooperate with case plan and to reach out to Children Services  for any assistance. Patient verbalized understanding.

## 2024-03-25 NOTE — TELEPHONE ENCOUNTER
LSW received return phone call from [facility] MFM/NICU  SW, Anat Echeverriaalexandru, JUSTYNAW-S, [phone].  SW stated baby Damaris is not in therapeutic foster care.  Baby in custody of [facility] now, was readmitted to hospital after two (2) failed foster care placements, needed a medically fragile foster placement.  First foster home was not for medically fragile and second foster home was, but neither foster parents able to provide extent of needed care for baby.  LSW advised was unable to locate baby's chart.   SW reported baby's chart under Girl Galina Summers, MRN [id number].  LSW still unable to locate chart for baby Damaris Mendoza,  24.

## 2024-08-23 ENCOUNTER — TELEPHONE (OUTPATIENT)
Dept: OBGYN CLINIC | Age: 34
End: 2024-08-23

## 2024-08-23 NOTE — TELEPHONE ENCOUNTER
Pt. Is approximately 7 weeks pregnant and is requesting that the  pill be called into her pharmacy for her.      I informed patient that [facility] and she can follow up on her request on Monday.

## 2024-08-23 NOTE — TELEPHONE ENCOUNTER
We do not do that, we are a [facility]. I will pass this along to her provider so she can speak to the patient.

## 2024-10-25 ENCOUNTER — TELEPHONE (OUTPATIENT)
Dept: OBGYN CLINIC | Age: 34
End: 2024-10-25

## 2024-10-25 NOTE — TELEPHONE ENCOUNTER
Patient called in wanting to reschedule due to her ride wasn't scheduled and I offered dec 13th she wasn't happy and said she will call the [place] office.

## 2024-11-20 ENCOUNTER — INITIAL PRENATAL (OUTPATIENT)
Dept: OBGYN | Age: 34
End: 2024-11-20
Payer: COMMERCIAL

## 2024-11-20 VITALS
SYSTOLIC BLOOD PRESSURE: 106 MMHG | WEIGHT: 214.2 LBS | HEART RATE: 88 BPM | DIASTOLIC BLOOD PRESSURE: 72 MMHG | BODY MASS INDEX: 32.57 KG/M2

## 2024-11-20 DIAGNOSIS — Z3A.22 22 WEEKS GESTATION OF PREGNANCY: Primary | ICD-10-CM

## 2024-11-20 DIAGNOSIS — E07.9 THYROID DYSFUNCTION, ANTEPARTUM: ICD-10-CM

## 2024-11-20 DIAGNOSIS — Z98.891 HISTORY OF C-SECTION: ICD-10-CM

## 2024-11-20 DIAGNOSIS — M32.9 SYSTEMIC LUPUS ERYTHEMATOSUS, UNSPECIFIED SLE TYPE, UNSPECIFIED ORGAN INVOLVEMENT STATUS (HCC): ICD-10-CM

## 2024-11-20 DIAGNOSIS — O09.522 SUPERVISION OF ELDERLY MULTIGRAVIDA IN SECOND TRIMESTER: ICD-10-CM

## 2024-11-20 DIAGNOSIS — O09.92 HIGH RISK PREGNANCY CASE MANAGEMENT PATIENT IN SECOND TRIMESTER: ICD-10-CM

## 2024-11-20 DIAGNOSIS — O24.113 TYPE 2 DIABETES MELLITUS AFFECTING PREGNANCY IN THIRD TRIMESTER, ANTEPARTUM: ICD-10-CM

## 2024-11-20 DIAGNOSIS — O99.280 THYROID DYSFUNCTION, ANTEPARTUM: ICD-10-CM

## 2024-11-20 PROCEDURE — G8427 DOCREV CUR MEDS BY ELIG CLIN: HCPCS | Performed by: MIDWIFE

## 2024-11-20 PROCEDURE — G8484 FLU IMMUNIZE NO ADMIN: HCPCS | Performed by: MIDWIFE

## 2024-11-20 PROCEDURE — 3078F DIAST BP <80 MM HG: CPT | Performed by: MIDWIFE

## 2024-11-20 PROCEDURE — G8419 CALC BMI OUT NRM PARAM NOF/U: HCPCS | Performed by: MIDWIFE

## 2024-11-20 PROCEDURE — 3044F HG A1C LEVEL LT 7.0%: CPT | Performed by: MIDWIFE

## 2024-11-20 PROCEDURE — 99203 OFFICE O/P NEW LOW 30 MIN: CPT | Performed by: MIDWIFE

## 2024-11-20 PROCEDURE — 4004F PT TOBACCO SCREEN RCVD TLK: CPT | Performed by: MIDWIFE

## 2024-11-20 PROCEDURE — 81002 URINALYSIS NONAUTO W/O SCOPE: CPT | Performed by: MIDWIFE

## 2024-11-20 PROCEDURE — 3074F SYST BP LT 130 MM HG: CPT | Performed by: MIDWIFE

## 2024-11-20 PROCEDURE — 2022F DILAT RTA XM EVC RTNOPTHY: CPT | Performed by: MIDWIFE

## 2024-11-20 RX ORDER — PNV NO.95/FERROUS FUM/FOLIC AC 28MG-0.8MG
1 TABLET ORAL DAILY
Qty: 30 TABLET | Refills: 11 | Status: SHIPPED | OUTPATIENT
Start: 2024-11-20

## 2024-11-20 NOTE — PATIENT INSTRUCTIONS
OB Teaching    Sheet given to patient with list of acceptable medications    Labor and delivery [phone], ask for midwife or physician provider on duty    Call coverage/emergencies:  Patient was informed of call coverage including 24 hour coverage by house physician and rotating call from nurse midwives.  Nurse midwives are available for most but not all shifts.  Female providers are available in the office, but there are male providers on labor and delivery some of the time.  Patient can call the clinic number for instructions on how to speak with the provider on call.  Patient told to proceed to ER/L&D if heavy vaginal bleeding, LOF or other concerning sx.      Course of pregnancy:  Patient informed that ELANA visits are monthly until 28 weeks, bi-weekly from 28-36 weeks, and weekly thereafter.  Patient informed that labs are planned at new OB visit, at 24 -28 weeks gestation, and GBS at 36 weeks gestation; ultrasound planned in first trimester and anatomy scan at 20 weeks with MFM.  Additional labs/imaging may be done if necessary, including genetic screening in first and second trimester.  Discussed genetic screening.    Nutrition:  Patient instructed to inform provider if vomiting and unable to keep anything down for 12 hours.  Nausea can be treated with small frequent meals, preventing dehydration, Vitamin B6 and Unisom.  These are very inexpensive, and are safe to take during pregnancy.  Take them at night as the Unisom will make you tired.  If these aren't helpful after 3-4 days of taking them every night, please let us know.  Prescriptions can be added if these measures not helpful.      Patient is to take prenatal vitamins and ensure that she has 400mcg of folic acid daily throughout pregnancy and continue throughout childbearing years.  Starting in second trimester, patient should have minimum of 70 grams of protein per day (4 servings).  Avoid soft cheeses, meats that are not cooked through, lunch

## 2024-11-20 NOTE — PROGRESS NOTES
Pt presents for  new OB appointment. Pt denies bleeding, cramping, and lof. Pt states good fetal movement. Pt had a PAP a couple weeks ago. Fetal heart tones were obtained without difficulty.   Lab orders and Panorama were hand given to patient and patient instructed to take to lab to have drawn. Pt verbalized understanding. Pt did not give us a urine sample today while at appointment. Discharge instructions have been discussed with the patient. Patient advised to call our office with any questions or concerns.   Voiced understanding.    
is ordered?     Answer:   22q11.2 Deletion     Order Specific Question:   Is this a twin pregnancy? (viable, no vanished twin)     Answer:   Not Twin Pregnancy, Doss     Order Specific Question:   Is this a surrogate or egg donor pregnancy?     Answer:   No     Order Specific Question:   I want fetal sex included in the report:     Answer:   Yes     Order Specific Question:   For RhD (-) patients only, do you want fetal RhD reported?     Answer:   No     Order Specific Question:   By placing this electronic order, I confirm the testing ordered herein is medically necessary and this patient has been informed of the details of the genetic test(s) ordered, including the risks, benefits, and alternatives, and has consented to testing.     Answer:   Yes     Order Specific Question:   Method/type of collection:     Answer:   Clinic to manage sample collection     Order Specific Question:   Select an order diagnosis:     Answer:   Supervision of elderly multigravida in second trimester [194320]     Order Specific Question:   Enroll this patient in the Automatic Redraw Program?     Answer:   Yes    Comprehensive Metabolic Panel     Standing Status:   Future     Standing Expiration Date:   11/20/2025    TSH     Standing Status:   Future     Standing Expiration Date:   11/20/2025    Ambulatory referral to Maternal Fetal     Referral Priority:   Routine     Referral Type:   Consult for Advice and Opinion     Referral Reason:   Specialty Services Required     Number of Visits Requested:   1    POCT urine qual dipstick glucose    POCT urine qual dipstick protein    Type and Screen     Standing Status:   Future     Standing Expiration Date:   11/20/2025        OB Teaching    Sheet given to patient with acceptable medications, food cooked thoroughly, etc.    Labor and delivery [phone], ask for midwife or physician provider on duty    Call coverage/emergencies:  Patient was informed of call coverage including 24 hour

## 2024-12-03 ENCOUNTER — TELEPHONE (OUTPATIENT)
Dept: OBGYN | Age: 34
End: 2024-12-03

## 2024-12-03 NOTE — TELEPHONE ENCOUNTER
Attempted to contact patient regarding her appointment on 12/16 that needs rescheduled.  Left voicemail for patient to return call.

## 2025-03-20 ENCOUNTER — ANESTHESIA (OUTPATIENT)
Dept: LABOR AND DELIVERY | Age: 35
End: 2025-03-20
Payer: COMMERCIAL

## 2025-03-20 ENCOUNTER — HOSPITAL ENCOUNTER (INPATIENT)
Age: 35
LOS: 1 days | Discharge: HOME | End: 2025-03-21
Attending: OBSTETRICS & GYNECOLOGY | Admitting: OBSTETRICS & GYNECOLOGY
Payer: COMMERCIAL

## 2025-03-20 ENCOUNTER — ANESTHESIA EVENT (OUTPATIENT)
Dept: LABOR AND DELIVERY | Age: 35
End: 2025-03-20
Payer: COMMERCIAL

## 2025-03-20 PROBLEM — Z3A.39 39 WEEKS GESTATION OF PREGNANCY: Status: ACTIVE | Noted: 2025-03-20

## 2025-03-20 PROBLEM — F19.90 SUBSTANCE USE DISORDER: Status: ACTIVE | Noted: 2025-03-20

## 2025-03-20 PROBLEM — O09.33 NO PRENATAL CARE IN CURRENT PREGNANCY IN THIRD TRIMESTER: Status: ACTIVE | Noted: 2025-03-20

## 2025-03-20 LAB
ABO + RH BLD: NORMAL
AMPHET UR QL SCN: NEGATIVE
ARM BAND NUMBER: NORMAL
BARBITURATES UR QL SCN: NEGATIVE
BENZODIAZ UR QL: NEGATIVE
BLOOD BANK SAMPLE EXPIRATION: NORMAL
BLOOD GROUP ANTIBODIES SERPL: NEGATIVE
BUPRENORPHINE UR QL: NEGATIVE
CANNABINOIDS UR QL SCN: NEGATIVE
COCAINE UR QL SCN: POSITIVE
ERYTHROCYTE [DISTWIDTH] IN BLOOD BY AUTOMATED COUNT: 13.8 % (ref 11.5–15)
FENTANYL UR QL: NEGATIVE
HCT VFR BLD AUTO: 43.8 % (ref 34–48)
HGB BLD-MCNC: 14.4 G/DL (ref 11.5–15.5)
HIV1+2 AB SERPLBLD QL IA.RAPID: NONREACTIVE
MCH RBC QN AUTO: 31.4 PG (ref 26–35)
MCHC RBC AUTO-ENTMCNC: 32.9 G/DL (ref 32–34.5)
MCV RBC AUTO: 95.6 FL (ref 80–99.9)
METHADONE UR QL: NEGATIVE
OPIATES UR QL SCN: NEGATIVE
OXYCODONE UR QL SCN: NEGATIVE
PCP UR QL SCN: NEGATIVE
PLATELET # BLD AUTO: 310 K/UL (ref 130–450)
PMV BLD AUTO: 11.4 FL (ref 7–12)
RBC # BLD AUTO: 4.58 M/UL (ref 3.5–5.5)
TEST INFORMATION: ABNORMAL
WBC OTHER # BLD: 13.4 K/UL (ref 4.5–11.5)

## 2025-03-20 PROCEDURE — 86765 RUBEOLA ANTIBODY: CPT

## 2025-03-20 PROCEDURE — 86735 MUMPS ANTIBODY: CPT

## 2025-03-20 PROCEDURE — 6370000000 HC RX 637 (ALT 250 FOR IP)

## 2025-03-20 PROCEDURE — 59514 CESAREAN DELIVERY ONLY: CPT | Performed by: OBSTETRICS & GYNECOLOGY

## 2025-03-20 PROCEDURE — 3700000000 HC ANESTHESIA ATTENDED CARE: Performed by: OBSTETRICS & GYNECOLOGY

## 2025-03-20 PROCEDURE — 87591 N.GONORRHOEAE DNA AMP PROB: CPT

## 2025-03-20 PROCEDURE — 2500000003 HC RX 250 WO HCPCS: Performed by: OBSTETRICS & GYNECOLOGY

## 2025-03-20 PROCEDURE — 2580000003 HC RX 258: Performed by: NURSE ANESTHETIST, CERTIFIED REGISTERED

## 2025-03-20 PROCEDURE — 86850 RBC ANTIBODY SCREEN: CPT

## 2025-03-20 PROCEDURE — 6360000002 HC RX W HCPCS: Performed by: OBSTETRICS & GYNECOLOGY

## 2025-03-20 PROCEDURE — 6360000002 HC RX W HCPCS: Performed by: NURSE ANESTHETIST, CERTIFIED REGISTERED

## 2025-03-20 PROCEDURE — 6360000002 HC RX W HCPCS

## 2025-03-20 PROCEDURE — 87491 CHLMYD TRACH DNA AMP PROBE: CPT

## 2025-03-20 PROCEDURE — 7100000001 HC PACU RECOVERY - ADDTL 15 MIN: Performed by: OBSTETRICS & GYNECOLOGY

## 2025-03-20 PROCEDURE — 3609079900 HC CESAREAN SECTION: Performed by: OBSTETRICS & GYNECOLOGY

## 2025-03-20 PROCEDURE — 86701 HIV-1ANTIBODY: CPT

## 2025-03-20 PROCEDURE — 2580000003 HC RX 258: Performed by: OBSTETRICS & GYNECOLOGY

## 2025-03-20 PROCEDURE — G0480 DRUG TEST DEF 1-7 CLASSES: HCPCS

## 2025-03-20 PROCEDURE — 80307 DRUG TEST PRSMV CHEM ANLYZR: CPT

## 2025-03-20 PROCEDURE — 85027 COMPLETE CBC AUTOMATED: CPT

## 2025-03-20 PROCEDURE — 3700000001 HC ADD 15 MINUTES (ANESTHESIA): Performed by: OBSTETRICS & GYNECOLOGY

## 2025-03-20 PROCEDURE — 86901 BLOOD TYPING SEROLOGIC RH(D): CPT

## 2025-03-20 PROCEDURE — 2709999900 HC NON-CHARGEABLE SUPPLY: Performed by: OBSTETRICS & GYNECOLOGY

## 2025-03-20 PROCEDURE — 86762 RUBELLA ANTIBODY: CPT

## 2025-03-20 PROCEDURE — APPNB15 APP NON BILLABLE TIME 0-15 MINS

## 2025-03-20 PROCEDURE — 86900 BLOOD TYPING SEROLOGIC ABO: CPT

## 2025-03-20 PROCEDURE — 59514 CESAREAN DELIVERY ONLY: CPT

## 2025-03-20 PROCEDURE — 7100000000 HC PACU RECOVERY - FIRST 15 MIN: Performed by: OBSTETRICS & GYNECOLOGY

## 2025-03-20 PROCEDURE — 87340 HEPATITIS B SURFACE AG IA: CPT

## 2025-03-20 PROCEDURE — 1220000001 HC SEMI PRIVATE L&D R&B

## 2025-03-20 PROCEDURE — 86592 SYPHILIS TEST NON-TREP QUAL: CPT

## 2025-03-20 RX ORDER — FAMOTIDINE 10 MG/ML
INJECTION, SOLUTION INTRAVENOUS
Status: DISCONTINUED
Start: 2025-03-20 | End: 2025-03-21

## 2025-03-20 RX ORDER — CEFAZOLIN 2 G/1
INJECTION, POWDER, FOR SOLUTION INTRAMUSCULAR; INTRAVENOUS
Status: DISCONTINUED
Start: 2025-03-20 | End: 2025-03-21

## 2025-03-20 RX ORDER — SODIUM CHLORIDE, SODIUM LACTATE, POTASSIUM CHLORIDE, CALCIUM CHLORIDE 600; 310; 30; 20 MG/100ML; MG/100ML; MG/100ML; MG/100ML
INJECTION, SOLUTION INTRAVENOUS CONTINUOUS
Status: DISCONTINUED | OUTPATIENT
Start: 2025-03-20 | End: 2025-03-21 | Stop reason: HOSPADM

## 2025-03-20 RX ORDER — SODIUM CHLORIDE 9 MG/ML
INJECTION, SOLUTION INTRAVENOUS PRN
Status: DISCONTINUED | OUTPATIENT
Start: 2025-03-20 | End: 2025-03-21 | Stop reason: HOSPADM

## 2025-03-20 RX ORDER — METOCLOPRAMIDE HYDROCHLORIDE 5 MG/ML
INJECTION INTRAMUSCULAR; INTRAVENOUS
Status: COMPLETED
Start: 2025-03-20 | End: 2025-03-20

## 2025-03-20 RX ORDER — METOCLOPRAMIDE HYDROCHLORIDE 5 MG/ML
10 INJECTION INTRAMUSCULAR; INTRAVENOUS ONCE
Status: COMPLETED | OUTPATIENT
Start: 2025-03-20 | End: 2025-03-20

## 2025-03-20 RX ORDER — ONDANSETRON 2 MG/ML
4 INJECTION INTRAMUSCULAR; INTRAVENOUS EVERY 6 HOURS PRN
Status: DISCONTINUED | OUTPATIENT
Start: 2025-03-20 | End: 2025-03-21 | Stop reason: HOSPADM

## 2025-03-20 RX ORDER — SODIUM CHLORIDE 9 MG/ML
INJECTION, SOLUTION INTRAMUSCULAR; INTRAVENOUS; SUBCUTANEOUS
Status: DISPENSED
Start: 2025-03-20 | End: 2025-03-21

## 2025-03-20 RX ORDER — SODIUM CHLORIDE, SODIUM LACTATE, POTASSIUM CHLORIDE, AND CALCIUM CHLORIDE .6; .31; .03; .02 G/100ML; G/100ML; G/100ML; G/100ML
1000 INJECTION, SOLUTION INTRAVENOUS ONCE
Status: DISCONTINUED | OUTPATIENT
Start: 2025-03-20 | End: 2025-03-21 | Stop reason: HOSPADM

## 2025-03-20 RX ORDER — SODIUM CHLORIDE 0.9 % (FLUSH) 0.9 %
5-40 SYRINGE (ML) INJECTION EVERY 12 HOURS SCHEDULED
Status: DISCONTINUED | OUTPATIENT
Start: 2025-03-20 | End: 2025-03-21 | Stop reason: HOSPADM

## 2025-03-20 RX ORDER — WATER 10 ML/10ML
INJECTION INTRAMUSCULAR; INTRAVENOUS; SUBCUTANEOUS
Status: DISPENSED
Start: 2025-03-20 | End: 2025-03-21

## 2025-03-20 RX ORDER — SODIUM CHLORIDE, SODIUM LACTATE, POTASSIUM CHLORIDE, CALCIUM CHLORIDE 600; 310; 30; 20 MG/100ML; MG/100ML; MG/100ML; MG/100ML
INJECTION, SOLUTION INTRAVENOUS
Status: DISCONTINUED | OUTPATIENT
Start: 2025-03-20 | End: 2025-03-20 | Stop reason: SDUPTHER

## 2025-03-20 RX ORDER — MORPHINE SULFATE 1 MG/ML
INJECTION, SOLUTION EPIDURAL; INTRATHECAL; INTRAVENOUS
Status: DISCONTINUED | OUTPATIENT
Start: 2025-03-20 | End: 2025-03-20 | Stop reason: SDUPTHER

## 2025-03-20 RX ORDER — SODIUM CHLORIDE 0.9 % (FLUSH) 0.9 %
10 SYRINGE (ML) INJECTION PRN
Status: DISCONTINUED | OUTPATIENT
Start: 2025-03-20 | End: 2025-03-21 | Stop reason: HOSPADM

## 2025-03-20 RX ORDER — ACETAMINOPHEN 325 MG/1
TABLET ORAL
Status: COMPLETED
Start: 2025-03-20 | End: 2025-03-20

## 2025-03-20 RX ORDER — ACETAMINOPHEN 325 MG/1
975 TABLET ORAL ONCE
Status: COMPLETED | OUTPATIENT
Start: 2025-03-20 | End: 2025-03-20

## 2025-03-20 RX ORDER — BUPIVACAINE HYDROCHLORIDE 7.5 MG/ML
INJECTION, SOLUTION INTRASPINAL
Status: DISCONTINUED | OUTPATIENT
Start: 2025-03-20 | End: 2025-03-20 | Stop reason: SDUPTHER

## 2025-03-20 RX ORDER — DIPHENHYDRAMINE HYDROCHLORIDE 50 MG/ML
INJECTION, SOLUTION INTRAMUSCULAR; INTRAVENOUS
Status: DISCONTINUED | OUTPATIENT
Start: 2025-03-20 | End: 2025-03-20 | Stop reason: SDUPTHER

## 2025-03-20 RX ADMIN — ACETAMINOPHEN 975 MG: 325 TABLET ORAL at 22:02

## 2025-03-20 RX ADMIN — METOCLOPRAMIDE 10 MG: 5 INJECTION, SOLUTION INTRAMUSCULAR; INTRAVENOUS at 21:59

## 2025-03-20 RX ADMIN — MORPHINE SULFATE 0.15 MG: 1 INJECTION EPIDURAL; INTRATHECAL; INTRAVENOUS at 22:14

## 2025-03-20 RX ADMIN — PHENYLEPHRINE HYDROCHLORIDE 100 MCG: 10 INJECTION INTRAVENOUS at 22:17

## 2025-03-20 RX ADMIN — FAMOTIDINE 20 MG: 10 INJECTION, SOLUTION INTRAVENOUS at 22:00

## 2025-03-20 RX ADMIN — METOCLOPRAMIDE HYDROCHLORIDE 10 MG: 5 INJECTION INTRAMUSCULAR; INTRAVENOUS at 21:59

## 2025-03-20 RX ADMIN — WATER 2000 MG: 1 INJECTION INTRAMUSCULAR; INTRAVENOUS; SUBCUTANEOUS at 22:00

## 2025-03-20 RX ADMIN — BUPIVACAINE HYDROCHLORIDE 1.6 ML: 7.5 INJECTION, SOLUTION SUBARACHNOID at 22:14

## 2025-03-20 RX ADMIN — PHENYLEPHRINE HYDROCHLORIDE 100 MCG: 10 INJECTION INTRAVENOUS at 22:25

## 2025-03-20 RX ADMIN — PHENYLEPHRINE HYDROCHLORIDE 100 MCG: 10 INJECTION INTRAVENOUS at 22:21

## 2025-03-20 RX ADMIN — SODIUM CHLORIDE, POTASSIUM CHLORIDE, SODIUM LACTATE AND CALCIUM CHLORIDE: 600; 310; 30; 20 INJECTION, SOLUTION INTRAVENOUS at 22:09

## 2025-03-20 RX ADMIN — Medication 909 ML/HR: at 22:24

## 2025-03-20 RX ADMIN — DIPHENHYDRAMINE HYDROCHLORIDE 25 MG: 50 INJECTION INTRAMUSCULAR; INTRAVENOUS at 22:27

## 2025-03-21 VITALS
RESPIRATION RATE: 16 BRPM | DIASTOLIC BLOOD PRESSURE: 82 MMHG | TEMPERATURE: 98.1 F | SYSTOLIC BLOOD PRESSURE: 128 MMHG | HEART RATE: 74 BPM | OXYGEN SATURATION: 97 %

## 2025-03-21 LAB
BZE UR-MCNC: >1000 NG/ML
COMPLIANCE DRUG ANALYSIS, URINE: NORMAL
ERYTHROCYTE [DISTWIDTH] IN BLOOD BY AUTOMATED COUNT: 13.5 % (ref 11.5–15)
HBV SURFACE AG SERPL QL IA: NONREACTIVE
HCT VFR BLD AUTO: 35.4 % (ref 34–48)
HGB BLD-MCNC: 11.1 G/DL (ref 11.5–15.5)
MCH RBC QN AUTO: 30.7 PG (ref 26–35)
MCHC RBC AUTO-ENTMCNC: 31.4 G/DL (ref 32–34.5)
MCV RBC AUTO: 97.8 FL (ref 80–99.9)
PLATELET # BLD AUTO: 234 K/UL (ref 130–450)
PMV BLD AUTO: 11.2 FL (ref 7–12)
RBC # BLD AUTO: 3.62 M/UL (ref 3.5–5.5)
RPR SER QL: NONREACTIVE
TSH SERPL DL<=0.05 MIU/L-ACNC: 2.4 UIU/ML (ref 0.27–4.2)
WBC OTHER # BLD: 14.9 K/UL (ref 4.5–11.5)

## 2025-03-21 PROCEDURE — 6360000002 HC RX W HCPCS: Performed by: ANESTHESIOLOGY

## 2025-03-21 PROCEDURE — 6370000000 HC RX 637 (ALT 250 FOR IP): Performed by: ANESTHESIOLOGY

## 2025-03-21 PROCEDURE — 84443 ASSAY THYROID STIM HORMONE: CPT

## 2025-03-21 PROCEDURE — 85027 COMPLETE CBC AUTOMATED: CPT

## 2025-03-21 PROCEDURE — 6370000000 HC RX 637 (ALT 250 FOR IP): Performed by: OBSTETRICS & GYNECOLOGY

## 2025-03-21 RX ORDER — KETOROLAC TROMETHAMINE 30 MG/ML
30 INJECTION, SOLUTION INTRAMUSCULAR; INTRAVENOUS EVERY 6 HOURS
Status: DISCONTINUED | OUTPATIENT
Start: 2025-03-22 | End: 2025-03-21 | Stop reason: HOSPADM

## 2025-03-21 RX ORDER — MODIFIED LANOLIN
OINTMENT (GRAM) TOPICAL
Status: DISCONTINUED | OUTPATIENT
Start: 2025-03-21 | End: 2025-03-21 | Stop reason: HOSPADM

## 2025-03-21 RX ORDER — OXYCODONE HYDROCHLORIDE 5 MG/1
10 TABLET ORAL EVERY 4 HOURS PRN
Status: DISCONTINUED | OUTPATIENT
Start: 2025-03-21 | End: 2025-03-21

## 2025-03-21 RX ORDER — NALOXONE HYDROCHLORIDE 0.4 MG/ML
INJECTION, SOLUTION INTRAMUSCULAR; INTRAVENOUS; SUBCUTANEOUS PRN
Status: DISCONTINUED | OUTPATIENT
Start: 2025-03-21 | End: 2025-03-21 | Stop reason: HOSPADM

## 2025-03-21 RX ORDER — SODIUM CHLORIDE 0.9 % (FLUSH) 0.9 %
5-40 SYRINGE (ML) INJECTION EVERY 12 HOURS SCHEDULED
Status: DISCONTINUED | OUTPATIENT
Start: 2025-03-21 | End: 2025-03-21 | Stop reason: HOSPADM

## 2025-03-21 RX ORDER — ACETAMINOPHEN 500 MG
1000 TABLET ORAL EVERY 6 HOURS SCHEDULED
Status: DISCONTINUED | OUTPATIENT
Start: 2025-03-21 | End: 2025-03-21 | Stop reason: HOSPADM

## 2025-03-21 RX ORDER — KETOROLAC TROMETHAMINE 30 MG/ML
30 INJECTION, SOLUTION INTRAMUSCULAR; INTRAVENOUS EVERY 6 HOURS PRN
Status: DISCONTINUED | OUTPATIENT
Start: 2025-03-21 | End: 2025-03-21 | Stop reason: HOSPADM

## 2025-03-21 RX ORDER — DOCUSATE SODIUM 100 MG/1
100 CAPSULE, LIQUID FILLED ORAL 2 TIMES DAILY
Status: DISCONTINUED | OUTPATIENT
Start: 2025-03-21 | End: 2025-03-21 | Stop reason: HOSPADM

## 2025-03-21 RX ORDER — DIPHENHYDRAMINE HYDROCHLORIDE 50 MG/ML
25 INJECTION, SOLUTION INTRAMUSCULAR; INTRAVENOUS EVERY 6 HOURS PRN
Status: DISCONTINUED | OUTPATIENT
Start: 2025-03-21 | End: 2025-03-21 | Stop reason: HOSPADM

## 2025-03-21 RX ORDER — IBUPROFEN 600 MG/1
600 TABLET, FILM COATED ORAL EVERY 6 HOURS
Status: DISCONTINUED | OUTPATIENT
Start: 2025-03-23 | End: 2025-03-21 | Stop reason: HOSPADM

## 2025-03-21 RX ORDER — ONDANSETRON 4 MG/1
4 TABLET, ORALLY DISINTEGRATING ORAL EVERY 8 HOURS PRN
Status: DISCONTINUED | OUTPATIENT
Start: 2025-03-21 | End: 2025-03-21 | Stop reason: HOSPADM

## 2025-03-21 RX ORDER — ONDANSETRON 2 MG/ML
4 INJECTION INTRAMUSCULAR; INTRAVENOUS EVERY 6 HOURS PRN
Status: DISCONTINUED | OUTPATIENT
Start: 2025-03-21 | End: 2025-03-21 | Stop reason: HOSPADM

## 2025-03-21 RX ORDER — OXYCODONE HYDROCHLORIDE 5 MG/1
5 TABLET ORAL EVERY 4 HOURS PRN
Status: DISCONTINUED | OUTPATIENT
Start: 2025-03-21 | End: 2025-03-21

## 2025-03-21 RX ORDER — NALBUPHINE HYDROCHLORIDE 10 MG/ML
5 INJECTION INTRAMUSCULAR; INTRAVENOUS; SUBCUTANEOUS EVERY 8 HOURS PRN
Status: DISCONTINUED | OUTPATIENT
Start: 2025-03-21 | End: 2025-03-21 | Stop reason: HOSPADM

## 2025-03-21 RX ORDER — SODIUM CHLORIDE 9 MG/ML
INJECTION, SOLUTION INTRAVENOUS PRN
Status: DISCONTINUED | OUTPATIENT
Start: 2025-03-21 | End: 2025-03-21 | Stop reason: HOSPADM

## 2025-03-21 RX ORDER — SODIUM CHLORIDE 0.9 % (FLUSH) 0.9 %
5-40 SYRINGE (ML) INJECTION PRN
Status: DISCONTINUED | OUTPATIENT
Start: 2025-03-21 | End: 2025-03-21 | Stop reason: HOSPADM

## 2025-03-21 RX ORDER — DIPHENHYDRAMINE HCL 25 MG
25 TABLET ORAL EVERY 6 HOURS PRN
Status: DISCONTINUED | OUTPATIENT
Start: 2025-03-21 | End: 2025-03-21 | Stop reason: HOSPADM

## 2025-03-21 RX ADMIN — KETOROLAC TROMETHAMINE 30 MG: 30 INJECTION, SOLUTION INTRAMUSCULAR at 10:06

## 2025-03-21 RX ADMIN — DIPHENHYDRAMINE HCL 25 MG: 25 TABLET ORAL at 06:04

## 2025-03-21 RX ADMIN — KETOROLAC TROMETHAMINE 30 MG: 30 INJECTION, SOLUTION INTRAMUSCULAR at 01:36

## 2025-03-21 RX ADMIN — DOCUSATE SODIUM 100 MG: 100 CAPSULE, LIQUID FILLED ORAL at 10:07

## 2025-03-21 ASSESSMENT — PAIN SCALES - GENERAL
PAINLEVEL_OUTOF10: 1
PAINLEVEL_OUTOF10: 8

## 2025-03-21 ASSESSMENT — PAIN DESCRIPTION - ORIENTATION: ORIENTATION: LOWER

## 2025-03-21 ASSESSMENT — PAIN DESCRIPTION - LOCATION: LOCATION: ABDOMEN

## 2025-03-21 ASSESSMENT — PAIN DESCRIPTION - DESCRIPTORS: DESCRIPTORS: DISCOMFORT;SORE

## 2025-03-21 NOTE — DISCHARGE INSTRUCTIONS
Follow-up with your OB doctor in 1 week if  delivery or in  6 weeks for vaginal delivery unless otherwise instructed.   Call office for an appointment.    For breastfeeding support, you can contact our lactation specialists at [phone] or [phone]    DIET  Eat a well balanced diet focusing on foods high in fiber and protein  Drink plenty of fluids especially water.  To avoid constipation you may take a mild stool softener as recommended by your doctor or midwife.    ACTIVITY  Gradually increase your activity.  Resume exercise regimen only after advised by your doctor or midwife.  Avoid lifting anything heavier than your baby or a gallon of milk for SIX weeks.   Avoid driving until your doctor or midwife has given their approval.  Rise slowly from a lying to sitting and then a standing position.  Climb stairs one at a time.  Use caution when carrying your baby up and down the stairs.  No sexual activity for 6 weeks or until advised by your doctor - Nothing in vagina: intercourse, tampons, or douching.   Be prepared to discuss family planning at your follow-up OB visit.   You may feel tired or have a lack of energy.  You may continue your prenatal vitamin to replenish nutrients post delivery.  Nap when baby naps to catch up on sleep.  May return to work or school in 6 weeks or as directed by OB.     EMOTIONS  You may feed eubanks, sad, teary, & overwhelmed.  Contact your OB provider if you feel you may be showing signs of postpartum depression, or have thoughts of harming yourself or your infant.  If infant will not stop crying, contact another adult for help or place infant in their crib on their back and take a break.  NEVER shake your infant.      BLEEDING  Vaginal bleeding will decrease in amount over the next few weeks.  You will notice that as your activity increases, your flow may increase.  This is your body's way of telling you, you need to take things easier and rest more often.  Call your

## 2025-03-21 NOTE — PROGRESS NOTES
Repeat LTCS of baby girl at 2223 by Dr Barney. Delayed cord clamping performed. APGARs assigned by NICU 9/9.  VSS.

## 2025-03-21 NOTE — ANESTHESIA POSTPROCEDURE EVALUATION
Department of Anesthesiology  Postprocedure Note    Patient: Galina Summers  MRN: [id number]  YOB: 1990  Date of evaluation: 3/21/2025    Procedure Summary       Date: 25 Room / Location: [address]    Anesthesia Start:  Anesthesia Stop:     Procedure:  SECTION (Uterus) Diagnosis:       Previous  section      (Previous  section [Z98.891])    Surgeons: Rebeka Barney DO Responsible Provider: Loc Sr DO    Anesthesia Type: spinal ASA Status: 3            Anesthesia Type: No value filed.    Jose Phase I:      Jose Phase II:      Anesthesia Post Evaluation    Patient location during evaluation: PACU  Patient participation: complete - patient participated  Level of consciousness: awake  Pain score: 3  Airway patency: patent  Nausea & Vomiting: no nausea and no vomiting  Cardiovascular status: blood pressure returned to baseline and hemodynamically stable  Respiratory status: acceptable  Hydration status: euvolemic  Pain management: adequate        No notable events documented.

## 2025-03-21 NOTE — PROGRESS NOTES
0144 This RN and Tiffani Sahra about to transfer patient to mother/baby unit when patient stated, \"Stop. Can you grab my phone out of my little black bag.\"    This RN clarified the bag she was asking about was the one with the logo \"[employer]\" and patient confirmed and stated,\"Grab my phone.\"  When this RN opened the black bag to retreive the phone, there was a knife and possible drug paraphernalia. This RN stopped and left the bag where everything was and updated Charge RN.     0146 [place] police notified at this time of need for [facility] [employer] to come to unit due to this RN finding a knife and possible drug paraphernalia in patients belongings.  Around 0150 Officer Abu at nurses station and updated on this RN's discoveries. Officer Abu went to patients bedside and belongings searched. Officer then collected drug paraphernalia, knife, and multitool.    Per Officer Abu patient is not allowed visitors and is a police hold and strict do not report.

## 2025-03-21 NOTE — CARE COORDINATION
MAR[facility]ON Discharge Planning     [facility] ( [phone]) caseworkers, Sirisha and Precious presented to the hospital to provide Galina with custody information. [employer] Police presented to ensure safety. MARLON received JR6 reporting that baby is now in the care of [facility] ( [phone]). Galina reported that she has chosen to leave AMA. JR6 document was placed in infant's chart. If medically ready, baby will be discharged home Monday with a foster family and SW will assist with discharge at that time.     PLAN  Baby can NOT be discharged home; if medically ready for discharge on Monday baby will be placed into foster care Monday and SW will assist with discharge. Galina is not allowed contact with baby at this time per [facility] ( [phone]) , however they will arrange visits after discharge      Electronically signed by KELVIN Millan on 3/21/2025 at 3:48 PM

## 2025-03-21 NOTE — PROGRESS NOTES
Department of Obstetrics and Gynecology  Labor and Delivery  Attending Post Partum Progress Note    Subjective:   Pt seen & examined, no overnight complaints.  Pain well controlled. Asks opioids to be discontinued and only get nubain.  Lochia decreasing.  Denies any fevers, chills, weakness, dizziness, headache, chest pain, vision changes, SOB, nausea, or vomiting.  Guillermo in place. Not yet ambulated    Review of Systems  Skin: no changes  All other review of systems negative    Physical Exam:  Vitals:    03/21/25 0036 03/21/25 0051 03/21/25 0303 03/21/25 0703   BP: 131/77 129/76 124/75 120/83   Pulse: 81 83 80 85   Resp:   18 16   Temp:   98.1 °F (36.7 °C) 97.7 °F (36.5 °C)   TempSrc:   Oral Oral   SpO2:   97% 95%       General: NAD, comfortable  CV: regular rate and rhythm  Lungs: clear to auscultation bilaterally  Abd: soft, non tender  Incision: c/d/i  Ext: no swelling      Labs:  Admission on 03/20/2025   Component Date Value Ref Range Status    Rapid HIV 1&2 03/20/2025 NONREACTIVE  NONREACTIVE Final    Amphetamine Screen, Ur 03/20/2025 NEGATIVE  NEGATIVE Final    Cutoff: 1000 ng/mL    Barbiturate Screen, Ur 03/20/2025 NEGATIVE  NEGATIVE Final    Cutoff: 200 ng/ml    Cocaine Metabolite, Urine 03/20/2025 POSITIVE (A)  NEGATIVE Final    Cutoff: 300 ng/ml    Benzodiazepine Screen, Urine 03/20/2025 NEGATIVE  NEGATIVE Final    Cutoff: 200 ng/ml    Buprenorphine Urine 03/20/2025 NEGATIVE  NEGATIVE Final    Cutoff: 5 ng/ml    Methadone Screen, Urine 03/20/2025 NEGATIVE  NEGATIVE Final    Cutoff: 300 ng/ml    Opiates, Urine 03/20/2025 NEGATIVE  NEGATIVE Final    Comment: Cutoff: 300 ng/ml  Note: The Opiate screen is not intended to detect Oxycodone.      Phencyclidine, Urine 03/20/2025 NEGATIVE  NEGATIVE Final    Cutoff: 25 ng/ml    Cannabinoid Scrn, Ur 03/20/2025 NEGATIVE  NEGATIVE Final    Cutoff: 50 ng/ml    Fentanyl, Ur 03/20/2025 NEGATIVE  NEGATIVE Final    Cutoff: 1.0 ng/ml    Oxycodone Screen, Ur 03/20/2025  postoperative recovery.    #drug use:  consult    #hx of chlamydia: results pending from this admission.    #DM2: FS fasting and prior to meals ordered. Endocrionlogy consult for management    #Hypothyroid: TSH not yet drawn, RN informed to draw. Medicine consult for management    1. Neuro: oxycodone d/c. Nubain already ordered by pior provider to be continued  2. CV: no issues  3. Pulm: encouraged IS  4. GI: adat   -senna prn  -zofran/reglan prn  5. : appropriate UOP, dc [place] when ambulating  6. Heme: 35postop hct, appropriate and asymptomatic  7. Postpartum: Per below  Rh +  MMR and Tdap if indicated  -local wound care  8. Prophy: scds, oob as tolerated, IS  9. Dispo: BRINDA Gaitan MD, MPH

## 2025-03-21 NOTE — CARE COORDINATION
SW Discharge Planning   SW received consult for \" No prenatal care, substance use disorder \"  SW noted mother positive for cocaine. Per chart review a knife was found in patient's purse along with drug  paraphernalia  and a white substance.       SW did attempt to meet with patient, who closed her eyes and would not respond to SW.  SW did complete a [facility] ( [phone]) referral to , Kenia GREWAL  Patient is known to [facility]    PLAN    Baby can NOT be discharged home until [facility] ( [phone]) provides disposition  SW to continue communication with nursing staff and [facility] ( [phone])      Electronically signed by KELVIN Millan on 3/21/2025 at 9:38 AM

## 2025-03-21 NOTE — PROGRESS NOTES
Patient on monitor from [phone]/ unable to trace FHT consistently due to patients movements due to pain.     FHT 170s when on EFM tracing

## 2025-03-21 NOTE — PROGRESS NOTES
This RN assisting Cassandra Summers RN with transfer of patient to mom baby, when patient stated \"Stop. Can you grab my phone out of the little black bag?\" Cassandra DEUTSCH went to retreive phone out of black bag. RN then stated to hold transfer at this time. Cassandra DEUTSCH notified charge nurse of findings.

## 2025-03-21 NOTE — CARE COORDINATION
SW Discharge Planning     SW received call from [facility] ( [phone]) , Sirisha, and we discussed concerns. Sirisha reported that the plan is for [facility] ( [phone]) to take custody of baby. Sirisha reported that due to concerns, baby needs to remain in nursery. Sirisha reported that she has a foster family ready who has Galina's other children, however they are on vacation. SW spoke with nurse supervisor, Lupe who was agreeable in allowing baby to stay until Monday.     Sirisha reported that she would be presenting to the hospital later today with another  also assigned.     PLAN    Baby can NOT be discharged home until [facility] ( [phone]) provides disposition  SW to continue communication with nursing staff and [facility] ( [phone])      BABY IS TO REMAIN IN NURSERY per [facility] ( [phone]) Sirisha    Electronically signed by KELVIN Millan on 3/21/2025 at 11:29 AM

## 2025-03-21 NOTE — FLOWSHEET NOTE
Mima mcgowan,latrice DTV [phone],dressing circled small amount old drainage ,edilia care done ambulated to bathroom with assist ,tolerated well ,call light with in reach ,encouraged to drink fluids

## 2025-03-21 NOTE — PROGRESS NOTES
PreOp DX:  Term Pregnancy, Previous C/Section, no prenatal care    Post OP Dx:  Term Pregnancy delivered by                        Living female baby delivered      Procedure:  Surgery/ Assistance    Under spinal anesthesia the abdomen was prepared and draped .    With my technical assistance Dr Barney performed  a , I assisted- in the absence of a surgical resident -with retraction, exposure, delivery of infant, and suture cutting/management throughout the entire procedure.     Meconium amniotic fluid.  A living female infant was delivered at 2223, weighing 3730 g with Apgar scores of 9 at 1 minute & 9 at 5 minutes, respectively.  (-) nuchal cord. (+) meconium.  3 vessel umbilical cord.    Then the uterus and the abdomen were closed.    Procedure was well tolerated.    SHASHANK Webb - CT

## 2025-03-21 NOTE — H&P
Department of Obstetrics and Gynecology  Labor and Delivery  History & Physical    Patient:  Galina Summers     Admit Date:  3/20/2025  9:30 PM  Medical Record Number:  [id number]    CHIEF COMPLAINT:  contractions, vaginal bleeding    PROBLEM LIST:     Patient Active Problem List   Diagnosis    Type 2 diabetes mellitus affecting pregnancy in third trimester, antepartum    Essential hypertension    Habitual aborter, antepartum    Obesity complicating pregnancy, third trimester    Thyroid dysfunction, antepartum    Hereditary disease in family possibly affecting fetus, affecting management of mother, antepartum condition or complication, single gestation    High risk pregnancy due to smoking in third trimester    Bipolar disease during pregnancy in second trimester (HCC)    Complicated pregnancy, third trimester    Maternal care due to low transverse uterine scar from previous  delivery    Previous  section complicating pregnancy, antepartum condition or complication    Chronic hepatitis C affecting antepartum care of mother in third trimester (HCC)    Previous  delivery affecting pregnancy    38 weeks gestation of pregnancy    Systemic lupus erythematosus (HCC)    Pregnancy complicated by fetal cerebral ventriculomegaly    Atrial septal defect of fetus affecting antepartum care of mother    History of pre-eclampsia    Family history of neural tube defect    Advanced maternal age in multigravida, third trimester    Anxiety during pregnancy    Depression affecting pregnancy    Marijuana use    History of hypothyroidism    Macrosomia    Tobacco smoking affecting pregnancy in third trimester    High risk pregnancy due to recurrent pregnancy loss in third trimester    History of     Encounter for supervision of high risk pregnancy due to fetal anomaly, third trimester    39 weeks gestation of pregnancy           HISTORY OF PRESENT ILLNESS:    The patient is a 34 y.o. female

## 2025-03-21 NOTE — ANESTHESIA PROCEDURE NOTES
Spinal Block    Patient location during procedure: OR  Reason for block: primary anesthetic  Staffing  Performed: resident/CRNA   Resident/CRNA: Walter Diop APRN - CRNA  Performed by: Walter Diop APRN - CRNA  Authorized by: Loc Sr DO    Spinal Block  Patient position: sitting  Prep: ChloraPrep  Patient monitoring: continuous pulse ox and frequent blood pressure checks  Approach: midline  Location: L3/L4  Provider prep: mask and sterile gloves  Local infiltration: lidocaine  Needle  Needle type: Quincke   Needle gauge: 22 G  Needle length: 3.5 in  Assessment  Sensory level: T4  Swirl obtained: Yes  CSF: clear  Attempts: 1  Hemodynamics: stable  Preanesthetic Checklist  Completed: patient identified, IV checked, site marked, risks and benefits discussed, surgical/procedural consents, equipment checked, pre-op evaluation, timeout performed, anesthesia consent given, oxygen available, monitors applied/VS acknowledged, fire risk safety assessment completed and verbalized and blood product R/B/A discussed and consented

## 2025-03-21 NOTE — PROGRESS NOTES
Patient admitted to room 313 performed head to toe assessment. Informed patient where she was and what the plan of care would be, patient not awake enough to go over paperwork, falling asleep mid sentence and while this RN and other RN speaking to her. Complains of no pain states she is still numb and had no complaints or questions at this time

## 2025-03-21 NOTE — ANESTHESIA PRE PROCEDURE
Anesthesia Plan      spinal     ASA 3       Induction: intravenous.    MIPS: Postoperative opioids intended and Prophylactic antiemetics administered.  Anesthetic plan and risks discussed with patient.    Use of blood products discussed with patient whom consented to blood products.                    SHASHANK Ibarra - CRNA   3/20/2025    DOS STAFF ADDENDUM:    Chart reviewed on DOS.  No interval change in history or exam.  I agree with the anesthesia pre-operative assessment written above and have made the appropriate addendums and/or changes.  Anesthesia plan discussed and risks/benefits addressed.  Patient's concerns and questions answered.  Considered full stomach.     Loc Sr DO  Staff Anesthesiologist  March 21, 2025  3:58 AM

## 2025-03-21 NOTE — OP NOTE
Operative Note      Patient: Galina Summers  YOB: 1990  MRN: [id number]    Date of Procedure: 3/20/2025    Pre-Op Diagnosis Codes:      * Previous  section [Z98.891] x 4  No prenatal care  Substance use disorder  Diabetes  Hypertension  Lupus  Obesity    Post-Op Diagnosis: Same meconium       Procedure(s):   SECTION repeat low-transverse    Surgeon(s):  Rebeka Barney DO    Assistant: Renu ASENCIO    Anesthesia: Spinal    Estimated Blood Loss (mL): 400    Complications: None    Specimens:   Placenta          Drains:   Urinary Catheter [address] (Active)       Findings:  Infection Present At Time Of Surgery (PATOS) (choose all levels that have infection present):  No infection present  Other Findings: At 2223 a female infant was delivered meconium stained Apgars 9 and 9 weight 8 pounds 4 ounces 3730 g    Detailed Description of Procedure:   Galina was identified in her labor room she was taken to the operating room and again correctly identified.  Spinal anesthesia was administered.  She was then placed in dorsal supine position Guillermo catheter inserted.  She was then prepped and draped in usual fashion.  A pause took place to identify the patient the procedure and that all instruments were present.  Pfannenstiel skin incision was made through her previous incision this was carried down to the fascia which was extended laterally and then superiorly and inferior in both directions.  The rectus muscle was divided in the midline and the peritoneum was entered bluntly and extended bluntly.  The Nathaniel retractor was positioned the lower uterine segment was 1 cell layer thick and just touching it with the blade we entered the uterine cavity.  Large amount of meconium stained fluid returned.  Hand was placed in uterine cavity fetal head was delivered mouth and nares were suctioned remainder of the fetus was delivered.  After 30 seconds the cord was clamped and cut.  A  sample of cord blood was obtained to be sent to lab.  Cord g gases were obtained.  The placenta was then delivered and sent to pathology.  The uterus was evacuated of all blood and clots.  It was then reapproximated with 0 Vicryl.  There was good hemostasis noted.  Paracolic gutters were evacuated of all blood and clots.  Nathaniel retractor was removed.  Omentum returned to its normal position.  Fascia was closed with strata fix.  Subcu was irrigated and then reapproximated 3-0 plain and the skin was closed subcuticular fashion with 4-0 Monocryl.  All sponges were counts were correct.  Patient Toller procedure well went to cover room stable.    Electronically signed by Rebeka Barney DO on 3/20/2025 at 10:50 PM

## 2025-03-21 NOTE — PROGRESS NOTES
[facility] [place] Hearing screening results were discussed with parent. Questions answered. Brochure given to parent. Advised to monitor developmental milestones and contact physician for any concerns.   Melody Marshall

## 2025-03-24 LAB
MEV IGG SER-ACNC: NORMAL
MUV IGG SER IA-ACNC: NORMAL
RUBV IGG SERPL QL IA: NORMAL IU/ML

## 2025-03-26 LAB
CHLAMYDIA DNA UR QL NAA+PROBE: NEGATIVE
N GONORRHOEA DNA UR QL NAA+PROBE: NEGATIVE
SPECIMEN DESCRIPTION: NORMAL

## 2025-04-04 LAB — SURGICAL PATHOLOGY REPORT: NORMAL

## (undated) DEVICE — 4-PORT MANIFOLD: Brand: NEPTUNE 2

## (undated) DEVICE — TUBE BLD COLLECT ST 1 SIL COAT 7ML 10ML

## (undated) DEVICE — PAD ABD CURITY TENDERSORB 5X9IN

## (undated) DEVICE — STANDARD HYPODERMIC NEEDLE,POLYPROPYLENE HUB: Brand: MONOJECT

## (undated) DEVICE — STAPLER SKIN SQ 30 ABSRB STPL DISP INSORB

## (undated) DEVICE — DRESSING FOAM POST OPERATIVE 4X10 IN MEPILEX BORDER AG

## (undated) DEVICE — DRESSING SUPERABSORBENT W4XL4IN 4 LAYR NONWOVEN FLD

## (undated) DEVICE — SUTURE STRATAFIX SYMMETRIC SZ 1 L18IN ABSRB VLT CT1 L36CM SXPP1A404

## (undated) DEVICE — SCALPEL SURG NO10 S STL ABS PLAS HNDL DISPOSABLE

## (undated) DEVICE — SUTURE CHROMIC GUT SZ 1 L36IN ABSRB BRN L48MM CTX 1/2 CIR 905H

## (undated) DEVICE — SUTURE PLN GUT SZ 3-0 L27IN ABSRB YELLOWISH TAN L36MM CT-1 842H

## (undated) DEVICE — GLOVE ORANGE PI 7   MSG9070

## (undated) DEVICE — GLOVE SURG SZ 75 L12IN FNGR THK83MIL CRM POLYISOPRENE

## (undated) DEVICE — RETRACTOR SURG M-L RNG DIA17CM INCIS 15CM ELAS SELF RET BLNT

## (undated) DEVICE — TOWEL,OR,DSP,ST,BLUE,STD,6/PK,12PK/CS: Brand: MEDLINE

## (undated) DEVICE — CESAREAN BIRTH PACK: Brand: MEDLINE INDUSTRIES, INC.

## (undated) DEVICE — SUTURE STRATAFIX SYMMETRIC PDS + SZ 1 L18IN ABSRB VLT L48MM SXPP1A400

## (undated) DEVICE — GOWN,SIRUS,POLYRNF,BRTHSLV,XLN/XL,20/CS: Brand: MEDLINE

## (undated) DEVICE — SYRINGE MED 10ML LUERLOCK TIP W/O SFTY DISP

## (undated) DEVICE — CATHETERIZATION KIT FOL16 FR 2000 CC DRAINAGE BG LUBRICATH

## (undated) DEVICE — HYPODERMIC SAFETY NEEDLE: Brand: MAGELLAN

## (undated) DEVICE — APPLICATOR PREP 26ML 0.7% IOD POVACRYLEX 74% ISO ALC ST

## (undated) DEVICE — CESAREAN BIRTH PACK II: Brand: MEDLINE INDUSTRIES, INC.

## (undated) DEVICE — PEN: MARKING STD 100/CS: Brand: MEDICAL ACTION INDUSTRIES

## (undated) DEVICE — SCALPEL SURG NO21 S STL STR W/ INTEGR MTRC RUL DISP

## (undated) DEVICE — SUTURE MCRYL + SZ 4-0 L18IN ABSRB UD L19MM PS-2 3/8 CIR MCP496G

## (undated) DEVICE — TELFA ADHESIVE ISLAND DRESSING: Brand: TELFA

## (undated) DEVICE — SPONGE LAP W18XL18IN WHT COT 4 PLY FLD STRUNG RADPQ DISP ST

## (undated) DEVICE — TUBING, SUCTION, 3/16" X 12', STRAIGHT: Brand: MEDLINE

## (undated) DEVICE — CHLORAPREP 26ML ORANGE

## (undated) DEVICE — SUTURE VCRL + SZ 3-0 L36IN ABSRB UD L36MM CT-1 1/2 CIR VCP944H

## (undated) DEVICE — SUTURE VICRYL + SZ 0 L36IN ABSRB VLT L36MM CT-1 1/2 CIR VCP346H

## (undated) DEVICE — SUTURE MCRYL SZ 0 L27IN ABSRB VLT CT-1 L36MM 1/2 CIR TAPR Y340H

## (undated) DEVICE — GLOVE ORANGE PI 7 1/2   MSG9075

## (undated) DEVICE — ELECTRODE PT RET AD L9FT HI MOIST COND ADH HYDRGEL CORDED

## (undated) DEVICE — SOLUTION IRRIG 500ML 0.9% SOD CHLO USP POUR PLAS BTL

## (undated) DEVICE — CONTAINER SPEC 64OZ POLYPR PATH SNAP LOK CAP W/ LID

## (undated) DEVICE — COUNTER NDL 30 COUNT DBL MAG

## (undated) DEVICE — 3000CC GUARDIAN II: Brand: GUARDIAN

## (undated) DEVICE — GLOVE SURG SZ 65 L12IN FNGR THK83MIL CRM POLYISOPRENE

## (undated) DEVICE — 3M™ STERI-STRIP™ COMPOUND BENZOIN TINCTURE 40 BAGS/CARTON 4 CARTONS/CASE C1544: Brand: 3M™ STERI-STRIP™

## (undated) DEVICE — LINER,SOFT,SUCTION CANISTER,1500CC: Brand: MEDLINE

## (undated) DEVICE — SUTURE VCRL SZ 1 L27IN ABSRB VLT L36MM CT-1 1/2 CIR J341H

## (undated) DEVICE — SLEEVE COMPRESS STD CALF KNEE SCD

## (undated) DEVICE — LARGE, DISPOSABLE ALEXIS O C-SECTION PROTECTOR - RETRACTOR: Brand: ALEXIS ® O C-SECTION PROTECTOR - RETRACTOR

## (undated) DEVICE — 3M™ STERI-STRIP™ ELASTIC SKIN CLOSURES, E4547, 1/2 IN X 4 IN (12 MM X 100 MM), 6 STRIPS/ENVELOPE: Brand: 3M™ STERI-STRIP™

## (undated) DEVICE — COVER,LIGHT HANDLE,FLX,2/PK: Brand: MEDLINE INDUSTRIES, INC.

## (undated) DEVICE — SHEET,DRAPE,53X77,STERILE: Brand: MEDLINE

## (undated) DEVICE — SUTURE MONOCRYL + SZ 4-0 L18IN ABSRB UD L19MM PS-2 3/8 CIR MCP496G

## (undated) DEVICE — Device: Brand: PORTEX

## (undated) DEVICE — MEDI-VAC YANKAUER SUCTION HANDLE W/BULBOUS TIP: Brand: CARDINAL HEALTH

## (undated) DEVICE — BLADE CLIPPER GEN PURP NS

## (undated) DEVICE — MASTISOL ADHESIVE LIQ 2/3ML

## (undated) DEVICE — 34" SINGLE PATIENT USE HOVERMATT BREATHABLE: Brand: SINGLE PATIENT USE HOVERMATT

## (undated) DEVICE — PENCIL ES L3M BTTN SWCH HOLSTER W/ BLDE ELECTRD EDGE

## (undated) DEVICE — BLADE SURG NO20 S STL STR DISP GLASSVAN

## (undated) DEVICE — TRAY CATH CATH OD16FR 200ML URIN M SIL CNTR ENTRY F

## (undated) DEVICE — GOWN,SIRUS,FABRNF,XL,20/CS: Brand: MEDLINE

## (undated) DEVICE — GLOVE SURG SZ 65 THK91MIL LTX FREE SYN POLYISOPRENE

## (undated) DEVICE — STRIP,CLOSURE,WOUND,MEDI-STRIP,1/2X4: Brand: MEDLINE

## (undated) DEVICE — GOWN,SIRUS,FABRNF,L,20/CS: Brand: MEDLINE

## (undated) DEVICE — CONTAINER,SPEC,PNEUM TUBE,3OZ,STRL PATH: Brand: MEDLINE